# Patient Record
Sex: FEMALE | Race: WHITE | NOT HISPANIC OR LATINO
[De-identification: names, ages, dates, MRNs, and addresses within clinical notes are randomized per-mention and may not be internally consistent; named-entity substitution may affect disease eponyms.]

---

## 2017-01-04 ENCOUNTER — RX RENEWAL (OUTPATIENT)
Age: 82
End: 2017-01-04

## 2017-02-03 ENCOUNTER — APPOINTMENT (OUTPATIENT)
Dept: INTERNAL MEDICINE | Facility: CLINIC | Age: 82
End: 2017-02-03

## 2017-02-03 ENCOUNTER — NON-APPOINTMENT (OUTPATIENT)
Age: 82
End: 2017-02-03

## 2017-02-03 VITALS
DIASTOLIC BLOOD PRESSURE: 90 MMHG | SYSTOLIC BLOOD PRESSURE: 150 MMHG | TEMPERATURE: 98.2 F | HEIGHT: 62 IN | BODY MASS INDEX: 25.03 KG/M2 | WEIGHT: 136 LBS | HEART RATE: 85 BPM

## 2017-02-03 DIAGNOSIS — B36.9 SUPERFICIAL MYCOSIS, UNSPECIFIED: ICD-10-CM

## 2017-02-03 DIAGNOSIS — B35.1 TINEA UNGUIUM: ICD-10-CM

## 2017-02-03 RX ORDER — KETOCONAZOLE 20 MG/G
2 CREAM TOPICAL TWICE DAILY
Qty: 2 | Refills: 1 | Status: ACTIVE | COMMUNITY
Start: 2017-02-03 | End: 1900-01-01

## 2017-04-26 ENCOUNTER — APPOINTMENT (OUTPATIENT)
Dept: ENDOCRINOLOGY | Facility: CLINIC | Age: 82
End: 2017-04-26

## 2017-04-26 VITALS
OXYGEN SATURATION: 98 % | SYSTOLIC BLOOD PRESSURE: 110 MMHG | WEIGHT: 135 LBS | DIASTOLIC BLOOD PRESSURE: 82 MMHG | HEART RATE: 79 BPM | HEIGHT: 62 IN | BODY MASS INDEX: 24.84 KG/M2

## 2017-04-26 RX ORDER — DENOSUMAB 60 MG/ML
60 INJECTION SUBCUTANEOUS
Qty: 1 | Refills: 0 | Status: COMPLETED | OUTPATIENT
Start: 2017-04-26

## 2017-04-26 RX ADMIN — DENOSUMAB 0 MG/ML: 60 INJECTION SUBCUTANEOUS at 00:00

## 2017-05-01 ENCOUNTER — LABORATORY RESULT (OUTPATIENT)
Age: 82
End: 2017-05-01

## 2017-05-01 ENCOUNTER — APPOINTMENT (OUTPATIENT)
Dept: INTERNAL MEDICINE | Facility: CLINIC | Age: 82
End: 2017-05-01

## 2017-05-01 VITALS
WEIGHT: 134 LBS | HEIGHT: 62 IN | DIASTOLIC BLOOD PRESSURE: 77 MMHG | SYSTOLIC BLOOD PRESSURE: 130 MMHG | HEART RATE: 81 BPM | BODY MASS INDEX: 24.66 KG/M2 | TEMPERATURE: 97.9 F

## 2017-05-01 DIAGNOSIS — R82.99 OTHER ABNORMAL FINDINGS IN URINE: ICD-10-CM

## 2017-05-01 DIAGNOSIS — Z00.00 ENCOUNTER FOR GENERAL ADULT MEDICAL EXAMINATION W/OUT ABNORMAL FINDINGS: ICD-10-CM

## 2017-05-03 ENCOUNTER — LABORATORY RESULT (OUTPATIENT)
Age: 82
End: 2017-05-03

## 2017-05-03 LAB
APPEARANCE: CLEAR
BASOPHILS # BLD AUTO: 0.08 K/UL
BASOPHILS NFR BLD AUTO: 0.9 %
BILIRUBIN URINE: NEGATIVE
BLOOD URINE: NEGATIVE
COLOR: YELLOW
CORE LAB FLUID CYTOLOGY: NORMAL
EOSINOPHIL # BLD AUTO: 0.08 K/UL
EOSINOPHIL NFR BLD AUTO: 0.9 %
GLUCOSE QUALITATIVE U: NORMAL MG/DL
HCT VFR BLD CALC: 37.6 %
HGB BLD-MCNC: 11.3 G/DL
KETONES URINE: NEGATIVE
LEUKOCYTE ESTERASE URINE: NEGATIVE
LYMPHOCYTES # BLD AUTO: 1.9 K/UL
LYMPHOCYTES NFR BLD AUTO: 22.4 %
MAN DIFF?: NORMAL
MCHC RBC-ENTMCNC: 21.3 PG
MCHC RBC-ENTMCNC: 30.1 GM/DL
MCV RBC AUTO: 70.9 FL
MONOCYTES # BLD AUTO: 0.44 K/UL
MONOCYTES NFR BLD AUTO: 5.2 %
NEUTROPHILS # BLD AUTO: 5.63 K/UL
NEUTROPHILS NFR BLD AUTO: 66.3 %
NITRITE URINE: NEGATIVE
PH URINE: 7.5
PLATELET # BLD AUTO: 347 K/UL
PROTEIN URINE: NEGATIVE MG/DL
RBC # BLD: 5.3 M/UL
RBC # FLD: 16.9 %
SPECIFIC GRAVITY URINE: 1.02
UROBILINOGEN URINE: 1 MG/DL
WBC # FLD AUTO: 8.49 K/UL

## 2017-05-08 ENCOUNTER — LABORATORY RESULT (OUTPATIENT)
Age: 82
End: 2017-05-08

## 2017-05-09 ENCOUNTER — LABORATORY RESULT (OUTPATIENT)
Age: 82
End: 2017-05-09

## 2017-05-12 ENCOUNTER — MEDICATION RENEWAL (OUTPATIENT)
Age: 82
End: 2017-05-12

## 2017-05-12 LAB
APPEARANCE: CLEAR
BASOPHILS # BLD AUTO: 0 K/UL
BASOPHILS NFR BLD AUTO: 0 %
BILIRUBIN URINE: NEGATIVE
BLOOD URINE: NEGATIVE
COLOR: YELLOW
EOSINOPHIL # BLD AUTO: 0.07 K/UL
EOSINOPHIL NFR BLD AUTO: 0.9 %
GLUCOSE QUALITATIVE U: NORMAL MG/DL
HCT VFR BLD CALC: 35.9 %
HGB BLD-MCNC: 11.3 G/DL
KETONES URINE: NEGATIVE
LEUKOCYTE ESTERASE URINE: ABNORMAL
LYMPHOCYTES # BLD AUTO: 2.14 K/UL
LYMPHOCYTES NFR BLD AUTO: 27 %
MAN DIFF?: NORMAL
MCHC RBC-ENTMCNC: 22.2 PG
MCHC RBC-ENTMCNC: 31.5 GM/DL
MCV RBC AUTO: 70.5 FL
MONOCYTES # BLD AUTO: 0.13 K/UL
MONOCYTES NFR BLD AUTO: 1.7 %
NEUTROPHILS # BLD AUTO: 5.03 K/UL
NEUTROPHILS NFR BLD AUTO: 63.4 %
NITRITE URINE: POSITIVE
PH URINE: 6.5
PLATELET # BLD AUTO: 334 K/UL
PROTEIN URINE: NEGATIVE MG/DL
RBC # BLD: 5.09 M/UL
RBC # FLD: 16.8 %
SPECIFIC GRAVITY URINE: 1.01
UROBILINOGEN URINE: NORMAL MG/DL
WBC # FLD AUTO: 7.94 K/UL

## 2017-05-15 ENCOUNTER — APPOINTMENT (OUTPATIENT)
Dept: CARDIOLOGY | Facility: CLINIC | Age: 82
End: 2017-05-15

## 2017-05-15 ENCOUNTER — NON-APPOINTMENT (OUTPATIENT)
Age: 82
End: 2017-05-15

## 2017-05-15 VITALS
OXYGEN SATURATION: 100 % | HEART RATE: 71 BPM | SYSTOLIC BLOOD PRESSURE: 148 MMHG | HEIGHT: 62 IN | WEIGHT: 135 LBS | BODY MASS INDEX: 24.84 KG/M2 | DIASTOLIC BLOOD PRESSURE: 89 MMHG

## 2017-10-06 ENCOUNTER — OUTPATIENT (OUTPATIENT)
Dept: OUTPATIENT SERVICES | Facility: HOSPITAL | Age: 82
LOS: 1 days | End: 2017-10-06
Payer: MEDICARE

## 2017-10-06 ENCOUNTER — APPOINTMENT (OUTPATIENT)
Dept: CT IMAGING | Facility: IMAGING CENTER | Age: 82
End: 2017-10-06
Payer: MEDICARE

## 2017-10-06 DIAGNOSIS — Z00.8 ENCOUNTER FOR OTHER GENERAL EXAMINATION: ICD-10-CM

## 2017-10-06 PROCEDURE — 74177 CT ABD & PELVIS W/CONTRAST: CPT

## 2017-10-06 PROCEDURE — 74177 CT ABD & PELVIS W/CONTRAST: CPT | Mod: 26

## 2017-10-06 PROCEDURE — 82565 ASSAY OF CREATININE: CPT

## 2017-10-10 ENCOUNTER — MEDICATION RENEWAL (OUTPATIENT)
Age: 82
End: 2017-10-10

## 2017-10-30 ENCOUNTER — APPOINTMENT (OUTPATIENT)
Dept: INTERNAL MEDICINE | Facility: CLINIC | Age: 82
End: 2017-10-30
Payer: MEDICARE

## 2017-10-30 VITALS
OXYGEN SATURATION: 98 % | HEIGHT: 62 IN | BODY MASS INDEX: 25.03 KG/M2 | TEMPERATURE: 97.9 F | SYSTOLIC BLOOD PRESSURE: 134 MMHG | DIASTOLIC BLOOD PRESSURE: 80 MMHG | HEART RATE: 92 BPM | WEIGHT: 136 LBS

## 2017-10-30 DIAGNOSIS — R93.8 ABNORMAL FINDINGS ON DIAGNOSTIC IMAGING OF OTHER SPECIFIED BODY STRUCTURES: ICD-10-CM

## 2017-10-30 DIAGNOSIS — Z23 ENCOUNTER FOR IMMUNIZATION: ICD-10-CM

## 2017-10-30 PROCEDURE — 90662 IIV NO PRSV INCREASED AG IM: CPT

## 2017-10-30 PROCEDURE — G0439: CPT | Mod: 25

## 2017-10-30 PROCEDURE — G0008: CPT

## 2017-10-30 RX ORDER — CIPROFLOXACIN HYDROCHLORIDE 250 MG/1
250 TABLET, FILM COATED ORAL
Qty: 10 | Refills: 0 | Status: DISCONTINUED | COMMUNITY
Start: 2017-05-12 | End: 2017-10-30

## 2017-11-01 ENCOUNTER — APPOINTMENT (OUTPATIENT)
Dept: ENDOCRINOLOGY | Facility: CLINIC | Age: 82
End: 2017-11-01
Payer: MEDICARE

## 2017-11-01 VITALS
SYSTOLIC BLOOD PRESSURE: 130 MMHG | HEART RATE: 78 BPM | OXYGEN SATURATION: 98 % | DIASTOLIC BLOOD PRESSURE: 80 MMHG | HEIGHT: 62 IN | WEIGHT: 139 LBS | BODY MASS INDEX: 25.58 KG/M2

## 2017-11-01 DIAGNOSIS — M81.0 AGE-RELATED OSTEOPOROSIS W/OUT CURRENT PATHOLOGICAL FRACTURE: ICD-10-CM

## 2017-11-01 PROCEDURE — 96372 THER/PROPH/DIAG INJ SC/IM: CPT

## 2017-11-01 PROCEDURE — 99214 OFFICE O/P EST MOD 30 MIN: CPT | Mod: 25

## 2017-11-01 RX ORDER — DENOSUMAB 60 MG/ML
60 INJECTION SUBCUTANEOUS
Qty: 0 | Refills: 0 | Status: COMPLETED | OUTPATIENT
Start: 2017-11-01

## 2017-11-01 RX ADMIN — DENOSUMAB 0 MG/ML: 60 INJECTION SUBCUTANEOUS at 00:00

## 2017-11-11 ENCOUNTER — INPATIENT (INPATIENT)
Facility: HOSPITAL | Age: 82
LOS: 2 days | Discharge: OTHER INSTITUTION | End: 2017-11-14
Attending: HOSPITALIST | Admitting: HOSPITALIST
Payer: MEDICARE

## 2017-11-11 VITALS
RESPIRATION RATE: 17 BRPM | SYSTOLIC BLOOD PRESSURE: 134 MMHG | TEMPERATURE: 98 F | OXYGEN SATURATION: 99 % | DIASTOLIC BLOOD PRESSURE: 83 MMHG | HEART RATE: 78 BPM

## 2017-11-11 LAB
ALBUMIN SERPL ELPH-MCNC: 3.8 G/DL — SIGNIFICANT CHANGE UP (ref 3.3–5)
ALP SERPL-CCNC: 58 U/L — SIGNIFICANT CHANGE UP (ref 40–120)
ALT FLD-CCNC: 6 U/L — SIGNIFICANT CHANGE UP (ref 4–33)
AST SERPL-CCNC: 10 U/L — SIGNIFICANT CHANGE UP (ref 4–32)
BASOPHILS # BLD AUTO: 0.03 K/UL — SIGNIFICANT CHANGE UP (ref 0–0.2)
BASOPHILS NFR BLD AUTO: 0.3 % — SIGNIFICANT CHANGE UP (ref 0–2)
BILIRUB SERPL-MCNC: 0.3 MG/DL — SIGNIFICANT CHANGE UP (ref 0.2–1.2)
BUN SERPL-MCNC: 19 MG/DL — SIGNIFICANT CHANGE UP (ref 7–23)
CALCIUM SERPL-MCNC: 8.5 MG/DL — SIGNIFICANT CHANGE UP (ref 8.4–10.5)
CHLORIDE SERPL-SCNC: 98 MMOL/L — SIGNIFICANT CHANGE UP (ref 98–107)
CO2 SERPL-SCNC: 24 MMOL/L — SIGNIFICANT CHANGE UP (ref 22–31)
CREAT SERPL-MCNC: 0.75 MG/DL — SIGNIFICANT CHANGE UP (ref 0.5–1.3)
EOSINOPHIL # BLD AUTO: 0.04 K/UL — SIGNIFICANT CHANGE UP (ref 0–0.5)
EOSINOPHIL NFR BLD AUTO: 0.4 % — SIGNIFICANT CHANGE UP (ref 0–6)
GLUCOSE SERPL-MCNC: 150 MG/DL — HIGH (ref 70–99)
HCT VFR BLD CALC: 38 % — SIGNIFICANT CHANGE UP (ref 34.5–45)
HGB BLD-MCNC: 11.7 G/DL — SIGNIFICANT CHANGE UP (ref 11.5–15.5)
IMM GRANULOCYTES # BLD AUTO: 0.08 # — SIGNIFICANT CHANGE UP
IMM GRANULOCYTES NFR BLD AUTO: 0.8 % — SIGNIFICANT CHANGE UP (ref 0–1.5)
LYMPHOCYTES # BLD AUTO: 1.4 K/UL — SIGNIFICANT CHANGE UP (ref 1–3.3)
LYMPHOCYTES # BLD AUTO: 13.2 % — SIGNIFICANT CHANGE UP (ref 13–44)
MCHC RBC-ENTMCNC: 21.6 PG — LOW (ref 27–34)
MCHC RBC-ENTMCNC: 30.8 % — LOW (ref 32–36)
MCV RBC AUTO: 70.1 FL — LOW (ref 80–100)
MONOCYTES # BLD AUTO: 0.65 K/UL — SIGNIFICANT CHANGE UP (ref 0–0.9)
MONOCYTES NFR BLD AUTO: 6.1 % — SIGNIFICANT CHANGE UP (ref 2–14)
NEUTROPHILS # BLD AUTO: 8.39 K/UL — HIGH (ref 1.8–7.4)
NEUTROPHILS NFR BLD AUTO: 79.2 % — HIGH (ref 43–77)
NRBC # FLD: 0 — SIGNIFICANT CHANGE UP
PLATELET # BLD AUTO: 329 K/UL — SIGNIFICANT CHANGE UP (ref 150–400)
PMV BLD: 9.2 FL — SIGNIFICANT CHANGE UP (ref 7–13)
POTASSIUM SERPL-MCNC: 3.8 MMOL/L — SIGNIFICANT CHANGE UP (ref 3.5–5.3)
POTASSIUM SERPL-SCNC: 3.8 MMOL/L — SIGNIFICANT CHANGE UP (ref 3.5–5.3)
PROT SERPL-MCNC: 6.8 G/DL — SIGNIFICANT CHANGE UP (ref 6–8.3)
RBC # BLD: 5.42 M/UL — HIGH (ref 3.8–5.2)
RBC # FLD: 16.8 % — HIGH (ref 10.3–14.5)
SODIUM SERPL-SCNC: 137 MMOL/L — SIGNIFICANT CHANGE UP (ref 135–145)
WBC # BLD: 10.59 K/UL — HIGH (ref 3.8–10.5)
WBC # FLD AUTO: 10.59 K/UL — HIGH (ref 3.8–10.5)

## 2017-11-11 RX ORDER — SODIUM CHLORIDE 9 MG/ML
1000 INJECTION INTRAMUSCULAR; INTRAVENOUS; SUBCUTANEOUS ONCE
Qty: 0 | Refills: 0 | Status: COMPLETED | OUTPATIENT
Start: 2017-11-11 | End: 2017-11-11

## 2017-11-11 RX ORDER — ONDANSETRON 8 MG/1
4 TABLET, FILM COATED ORAL ONCE
Qty: 0 | Refills: 0 | Status: COMPLETED | OUTPATIENT
Start: 2017-11-11 | End: 2017-11-11

## 2017-11-11 RX ADMIN — SODIUM CHLORIDE 1000 MILLILITER(S): 9 INJECTION INTRAMUSCULAR; INTRAVENOUS; SUBCUTANEOUS at 23:20

## 2017-11-11 RX ADMIN — ONDANSETRON 4 MILLIGRAM(S): 8 TABLET, FILM COATED ORAL at 23:20

## 2017-11-11 NOTE — ED PROVIDER NOTE - OBJECTIVE STATEMENT
86yo f pmh afib(on eloquis), parkinsons, hypothyroid, htn, chronic constipation pw abdominal pain, nausea, and vomiting. NBNB emesis since this afternoon.  Mild crampy belly pain with softer than usual well formed stools.  Pt was recently started on Amitiza and had been titrating up the dose, pt then stopped the medication because her prescription ran out, pt restarted meds today and then developed symptoms. No known fevers/chills/urinary symptoms.

## 2017-11-11 NOTE — ED ADULT NURSE NOTE - ED STAT RN HANDOFF DETAILS
Report given to CDU, pt was able to tolerate PO Challenge, NAD observed at this time, pt appears comfortable with son at bedside, Pt still complaining of mild nausea and weakness, pt able to stand and ambulate with assistance "shuffled" states she felt weak.

## 2017-11-11 NOTE — ED PROVIDER NOTE - PROGRESS NOTE DETAILS
Manuel SHAW: I received sign out from Dr Martinez. UA negative, pt reports feeling better after IVFs.  She is tolerating POs, no nausea or recurrent vomiting.  Pt cautioned about restarting amitiza until GI follow-up.  Pt is stable for discharge home. Manuel SHAW: Pt was feeling better, discharged but then started to feel weak and shaky.  States that she vomited after her dose of sinemet last night and has not taken her morning dose yet.  Will hold discharge, give home dose of sinemet and reassess.  If pt cont to be unable to walk, will need admission. Pt is feeling better after sinemet but still generally weak.  Will obs in cdu for repeat labs, hydration (pt requesting PO hydration for now), reassessment.

## 2017-11-11 NOTE — ED ADULT TRIAGE NOTE - CHIEF COMPLAINT QUOTE
Pt arrives to ED via EMS c/o Nausea and dizziness since Thursday.  Pt recently started a new medication for "bloating."  Pt started vomiting today.  Pt reports she typically has dizziness due to her vision.  New medication is called Amitiza 8 microgram capsule but since 11/3/17 she has increased the dosage.  Pt has also had increased BM frequency.  Pt denies pain.

## 2017-11-11 NOTE — ED PROVIDER NOTE - MEDICAL DECISION MAKING DETAILS
pt with likely medication side effect n/v after starting new med, will give fluids/zofran, check basics.

## 2017-11-11 NOTE — ED PROVIDER NOTE - ATTENDING CONTRIBUTION TO CARE
87F h/o HTN, afib on eloquis, hypothyroid presents for n/v/d.  H/o constipation was recently started on a new medication by GI, which she has been steadily increasing.  Reports that she has been having more regular stools, tonight with softer stool.  Also with nausea and vomiting.  Feels generally weak, no lightheadedness. Unchanged chronic abdominal bloating, no pain. On exam well appearing, mmm, lungs clear, rrr, abd soft, 2+ pulses, no edema, no rash, speech clear, no focal neuro deficits.  Plan for labs, IVF, zofran, EKG.  No focal abd ttp therefore no imaging at this time.

## 2017-11-11 NOTE — ED ADULT NURSE NOTE - OBJECTIVE STATEMENT
pt received to room #3 with c/o dizziness progressively becoming worse with nausea and vomiting today. pt states dizziness get worse when changing from a sitting/lying position to standing. pt appears flush and diaphoretic. son at bedside. denies cp and sob. IV placed, labs drawn and sent. pending MD avalos, will cont to monitor.

## 2017-11-12 DIAGNOSIS — Z29.9 ENCOUNTER FOR PROPHYLACTIC MEASURES, UNSPECIFIED: ICD-10-CM

## 2017-11-12 DIAGNOSIS — R71.8 OTHER ABNORMALITY OF RED BLOOD CELLS: ICD-10-CM

## 2017-11-12 DIAGNOSIS — R11.2 NAUSEA WITH VOMITING, UNSPECIFIED: ICD-10-CM

## 2017-11-12 DIAGNOSIS — E03.9 HYPOTHYROIDISM, UNSPECIFIED: ICD-10-CM

## 2017-11-12 DIAGNOSIS — I48.91 UNSPECIFIED ATRIAL FIBRILLATION: ICD-10-CM

## 2017-11-12 DIAGNOSIS — I10 ESSENTIAL (PRIMARY) HYPERTENSION: ICD-10-CM

## 2017-11-12 DIAGNOSIS — G20 PARKINSON'S DISEASE: ICD-10-CM

## 2017-11-12 DIAGNOSIS — R53.1 WEAKNESS: ICD-10-CM

## 2017-11-12 DIAGNOSIS — K59.09 OTHER CONSTIPATION: ICD-10-CM

## 2017-11-12 LAB
ALBUMIN SERPL ELPH-MCNC: 3.8 G/DL — SIGNIFICANT CHANGE UP (ref 3.3–5)
ALP SERPL-CCNC: 59 U/L — SIGNIFICANT CHANGE UP (ref 40–120)
ALT FLD-CCNC: 5 U/L — SIGNIFICANT CHANGE UP (ref 4–33)
APPEARANCE UR: CLEAR — SIGNIFICANT CHANGE UP
APPEARANCE UR: CLEAR — SIGNIFICANT CHANGE UP
AST SERPL-CCNC: 12 U/L — SIGNIFICANT CHANGE UP (ref 4–32)
BASOPHILS # BLD AUTO: 0.03 K/UL — SIGNIFICANT CHANGE UP (ref 0–0.2)
BASOPHILS NFR BLD AUTO: 0.3 % — SIGNIFICANT CHANGE UP (ref 0–2)
BILIRUB SERPL-MCNC: 0.4 MG/DL — SIGNIFICANT CHANGE UP (ref 0.2–1.2)
BILIRUB UR-MCNC: NEGATIVE — SIGNIFICANT CHANGE UP
BILIRUB UR-MCNC: NEGATIVE — SIGNIFICANT CHANGE UP
BLOOD UR QL VISUAL: NEGATIVE — SIGNIFICANT CHANGE UP
BLOOD UR QL VISUAL: NEGATIVE — SIGNIFICANT CHANGE UP
BUN SERPL-MCNC: 13 MG/DL — SIGNIFICANT CHANGE UP (ref 7–23)
CALCIUM SERPL-MCNC: 8.2 MG/DL — LOW (ref 8.4–10.5)
CHLORIDE SERPL-SCNC: 102 MMOL/L — SIGNIFICANT CHANGE UP (ref 98–107)
CO2 SERPL-SCNC: 24 MMOL/L — SIGNIFICANT CHANGE UP (ref 22–31)
COLOR SPEC: SIGNIFICANT CHANGE UP
COLOR SPEC: SIGNIFICANT CHANGE UP
CREAT SERPL-MCNC: 0.58 MG/DL — SIGNIFICANT CHANGE UP (ref 0.5–1.3)
EOSINOPHIL # BLD AUTO: 0.03 K/UL — SIGNIFICANT CHANGE UP (ref 0–0.5)
EOSINOPHIL NFR BLD AUTO: 0.3 % — SIGNIFICANT CHANGE UP (ref 0–6)
GLUCOSE SERPL-MCNC: 114 MG/DL — HIGH (ref 70–99)
GLUCOSE UR-MCNC: NEGATIVE — SIGNIFICANT CHANGE UP
GLUCOSE UR-MCNC: NEGATIVE — SIGNIFICANT CHANGE UP
HBA1C BLD-MCNC: 5.9 % — HIGH (ref 4–5.6)
HCT VFR BLD CALC: 38.6 % — SIGNIFICANT CHANGE UP (ref 34.5–45)
HGB BLD-MCNC: 11.5 G/DL — SIGNIFICANT CHANGE UP (ref 11.5–15.5)
IMM GRANULOCYTES # BLD AUTO: 0.04 # — SIGNIFICANT CHANGE UP
IMM GRANULOCYTES NFR BLD AUTO: 0.4 % — SIGNIFICANT CHANGE UP (ref 0–1.5)
KETONES UR-MCNC: NEGATIVE — SIGNIFICANT CHANGE UP
KETONES UR-MCNC: NEGATIVE — SIGNIFICANT CHANGE UP
LEUKOCYTE ESTERASE UR-ACNC: HIGH
LEUKOCYTE ESTERASE UR-ACNC: NEGATIVE — SIGNIFICANT CHANGE UP
LIDOCAIN IGE QN: 25.8 U/L — SIGNIFICANT CHANGE UP (ref 7–60)
LYMPHOCYTES # BLD AUTO: 1.76 K/UL — SIGNIFICANT CHANGE UP (ref 1–3.3)
LYMPHOCYTES # BLD AUTO: 15.9 % — SIGNIFICANT CHANGE UP (ref 13–44)
MCHC RBC-ENTMCNC: 21 PG — LOW (ref 27–34)
MCHC RBC-ENTMCNC: 29.8 % — LOW (ref 32–36)
MCV RBC AUTO: 70.4 FL — LOW (ref 80–100)
MONOCYTES # BLD AUTO: 0.8 K/UL — SIGNIFICANT CHANGE UP (ref 0–0.9)
MONOCYTES NFR BLD AUTO: 7.2 % — SIGNIFICANT CHANGE UP (ref 2–14)
MUCOUS THREADS # UR AUTO: SIGNIFICANT CHANGE UP
NEUTROPHILS # BLD AUTO: 8.43 K/UL — HIGH (ref 1.8–7.4)
NEUTROPHILS NFR BLD AUTO: 75.9 % — SIGNIFICANT CHANGE UP (ref 43–77)
NITRITE UR-MCNC: NEGATIVE — SIGNIFICANT CHANGE UP
NITRITE UR-MCNC: NEGATIVE — SIGNIFICANT CHANGE UP
NON-SQ EPI CELLS # UR AUTO: <1 — SIGNIFICANT CHANGE UP
NRBC # FLD: 0 — SIGNIFICANT CHANGE UP
PH UR: 7 — SIGNIFICANT CHANGE UP (ref 4.6–8)
PH UR: 7 — SIGNIFICANT CHANGE UP (ref 4.6–8)
PLATELET # BLD AUTO: 341 K/UL — SIGNIFICANT CHANGE UP (ref 150–400)
PMV BLD: 9.8 FL — SIGNIFICANT CHANGE UP (ref 7–13)
POTASSIUM SERPL-MCNC: 4.6 MMOL/L — SIGNIFICANT CHANGE UP (ref 3.5–5.3)
POTASSIUM SERPL-SCNC: 4.6 MMOL/L — SIGNIFICANT CHANGE UP (ref 3.5–5.3)
PROT SERPL-MCNC: 6.5 G/DL — SIGNIFICANT CHANGE UP (ref 6–8.3)
PROT UR-MCNC: 10 — SIGNIFICANT CHANGE UP
PROT UR-MCNC: NEGATIVE — SIGNIFICANT CHANGE UP
RBC # BLD: 5.48 M/UL — HIGH (ref 3.8–5.2)
RBC # FLD: 17.2 % — HIGH (ref 10.3–14.5)
RBC CASTS # UR COMP ASSIST: SIGNIFICANT CHANGE UP (ref 0–?)
SODIUM SERPL-SCNC: 138 MMOL/L — SIGNIFICANT CHANGE UP (ref 135–145)
SP GR SPEC: 1.01 — SIGNIFICANT CHANGE UP (ref 1–1.03)
SP GR SPEC: 1.01 — SIGNIFICANT CHANGE UP (ref 1–1.03)
SQUAMOUS # UR AUTO: SIGNIFICANT CHANGE UP
SQUAMOUS # UR AUTO: SIGNIFICANT CHANGE UP
TRANS CELLS #/AREA URNS HPF: 3 — SIGNIFICANT CHANGE UP
TSH SERPL-MCNC: 0.47 UIU/ML — SIGNIFICANT CHANGE UP (ref 0.27–4.2)
UROBILINOGEN FLD QL: NORMAL E.U. — SIGNIFICANT CHANGE UP (ref 0.1–0.2)
UROBILINOGEN FLD QL: NORMAL E.U. — SIGNIFICANT CHANGE UP (ref 0.1–0.2)
WBC # BLD: 11.09 K/UL — HIGH (ref 3.8–10.5)
WBC # FLD AUTO: 11.09 K/UL — HIGH (ref 3.8–10.5)
WBC UR QL: SIGNIFICANT CHANGE UP (ref 0–?)
WBC UR QL: SIGNIFICANT CHANGE UP (ref 0–?)

## 2017-11-12 PROCEDURE — 99223 1ST HOSP IP/OBS HIGH 75: CPT

## 2017-11-12 RX ORDER — LOSARTAN POTASSIUM 100 MG/1
100 TABLET, FILM COATED ORAL DAILY
Qty: 0 | Refills: 0 | Status: DISCONTINUED | OUTPATIENT
Start: 2017-11-12 | End: 2017-11-14

## 2017-11-12 RX ORDER — CARBIDOPA AND LEVODOPA 25; 100 MG/1; MG/1
1 TABLET ORAL
Qty: 0 | Refills: 0 | Status: DISCONTINUED | OUTPATIENT
Start: 2017-11-12 | End: 2017-11-14

## 2017-11-12 RX ORDER — SIMETHICONE 80 MG/1
80 TABLET, CHEWABLE ORAL THREE TIMES A DAY
Qty: 0 | Refills: 0 | Status: DISCONTINUED | OUTPATIENT
Start: 2017-11-12 | End: 2017-11-14

## 2017-11-12 RX ORDER — ONDANSETRON 8 MG/1
4 TABLET, FILM COATED ORAL EVERY 8 HOURS
Qty: 0 | Refills: 0 | Status: DISCONTINUED | OUTPATIENT
Start: 2017-11-12 | End: 2017-11-14

## 2017-11-12 RX ORDER — APIXABAN 2.5 MG/1
1 TABLET, FILM COATED ORAL
Qty: 0 | Refills: 0 | COMMUNITY

## 2017-11-12 RX ORDER — LEVOTHYROXINE SODIUM 125 MCG
100 TABLET ORAL DAILY
Qty: 0 | Refills: 0 | Status: DISCONTINUED | OUTPATIENT
Start: 2017-11-12 | End: 2017-11-14

## 2017-11-12 RX ORDER — SENNA PLUS 8.6 MG/1
2 TABLET ORAL AT BEDTIME
Qty: 0 | Refills: 0 | Status: DISCONTINUED | OUTPATIENT
Start: 2017-11-12 | End: 2017-11-14

## 2017-11-12 RX ORDER — AMLODIPINE BESYLATE 2.5 MG/1
1 TABLET ORAL
Qty: 0 | Refills: 0 | COMMUNITY

## 2017-11-12 RX ORDER — CARBIDOPA AND LEVODOPA 25; 100 MG/1; MG/1
1 TABLET ORAL ONCE
Qty: 0 | Refills: 0 | Status: COMPLETED | OUTPATIENT
Start: 2017-11-12 | End: 2017-11-12

## 2017-11-12 RX ORDER — DOCUSATE SODIUM 100 MG
100 CAPSULE ORAL THREE TIMES A DAY
Qty: 0 | Refills: 0 | Status: DISCONTINUED | OUTPATIENT
Start: 2017-11-12 | End: 2017-11-14

## 2017-11-12 RX ORDER — ONDANSETRON 8 MG/1
1 TABLET, FILM COATED ORAL
Qty: 2 | Refills: 0 | OUTPATIENT
Start: 2017-11-12

## 2017-11-12 RX ORDER — CHOLECALCIFEROL (VITAMIN D3) 125 MCG
1 CAPSULE ORAL
Qty: 0 | Refills: 0 | COMMUNITY

## 2017-11-12 RX ORDER — CHOLECALCIFEROL (VITAMIN D3) 125 MCG
2000 CAPSULE ORAL DAILY
Qty: 0 | Refills: 0 | Status: DISCONTINUED | OUTPATIENT
Start: 2017-11-12 | End: 2017-11-14

## 2017-11-12 RX ORDER — LEVOTHYROXINE SODIUM 125 MCG
1 TABLET ORAL
Qty: 0 | Refills: 0 | COMMUNITY

## 2017-11-12 RX ORDER — CALCIUM CARBONATE 500(1250)
0 TABLET ORAL
Qty: 0 | Refills: 0 | COMMUNITY

## 2017-11-12 RX ORDER — MULTIVIT-MIN/FERROUS GLUCONATE 9 MG/15 ML
1 LIQUID (ML) ORAL
Qty: 0 | Refills: 0 | COMMUNITY

## 2017-11-12 RX ORDER — SODIUM CHLORIDE 9 MG/ML
1000 INJECTION INTRAMUSCULAR; INTRAVENOUS; SUBCUTANEOUS
Qty: 0 | Refills: 0 | Status: DISCONTINUED | OUTPATIENT
Start: 2017-11-12 | End: 2017-11-14

## 2017-11-12 RX ORDER — SODIUM CHLORIDE 9 MG/ML
1000 INJECTION INTRAMUSCULAR; INTRAVENOUS; SUBCUTANEOUS
Qty: 0 | Refills: 0 | Status: DISCONTINUED | OUTPATIENT
Start: 2017-11-12 | End: 2017-11-12

## 2017-11-12 RX ORDER — APIXABAN 2.5 MG/1
2.5 TABLET, FILM COATED ORAL EVERY 12 HOURS
Qty: 0 | Refills: 0 | Status: DISCONTINUED | OUTPATIENT
Start: 2017-11-12 | End: 2017-11-14

## 2017-11-12 RX ORDER — AMLODIPINE BESYLATE 2.5 MG/1
5 TABLET ORAL DAILY
Qty: 0 | Refills: 0 | Status: DISCONTINUED | OUTPATIENT
Start: 2017-11-12 | End: 2017-11-14

## 2017-11-12 RX ADMIN — CARBIDOPA AND LEVODOPA 1 TABLET(S): 25; 100 TABLET ORAL at 09:38

## 2017-11-12 RX ADMIN — CARBIDOPA AND LEVODOPA 1 TABLET(S): 25; 100 TABLET ORAL at 17:23

## 2017-11-12 RX ADMIN — Medication 100 MILLIGRAM(S): at 22:02

## 2017-11-12 RX ADMIN — SODIUM CHLORIDE 200 MILLILITER(S): 9 INJECTION INTRAMUSCULAR; INTRAVENOUS; SUBCUTANEOUS at 07:14

## 2017-11-12 RX ADMIN — CARBIDOPA AND LEVODOPA 1 TABLET(S): 25; 100 TABLET ORAL at 05:15

## 2017-11-12 RX ADMIN — SODIUM CHLORIDE 75 MILLILITER(S): 9 INJECTION INTRAMUSCULAR; INTRAVENOUS; SUBCUTANEOUS at 19:08

## 2017-11-12 RX ADMIN — Medication 100 MICROGRAM(S): at 07:12

## 2017-11-12 RX ADMIN — APIXABAN 2.5 MILLIGRAM(S): 2.5 TABLET, FILM COATED ORAL at 17:24

## 2017-11-12 RX ADMIN — Medication 1 DROP(S): at 23:30

## 2017-11-12 RX ADMIN — AMLODIPINE BESYLATE 5 MILLIGRAM(S): 2.5 TABLET ORAL at 07:12

## 2017-11-12 RX ADMIN — SODIUM CHLORIDE 120 MILLILITER(S): 9 INJECTION INTRAMUSCULAR; INTRAVENOUS; SUBCUTANEOUS at 12:45

## 2017-11-12 RX ADMIN — LOSARTAN POTASSIUM 100 MILLIGRAM(S): 100 TABLET, FILM COATED ORAL at 07:12

## 2017-11-12 NOTE — H&P ADULT - NSHPLABSRESULTS_GEN_ALL_CORE
138  |  102  |  13  ----------------------------<  114<H>  4.6   |  24  |  0.58    Ca    8.2<L>      2017 07:35    TPro  6.5  /  Alb  3.8  /  TBili  0.4  /  DBili  x   /  AST  12  /  ALT  5   /  AlkPhos  59                          11.5   11.09 )-----------( 341      ( 2017 07:35 )             38.6     LIVER FUNCTIONS - ( 2017 07:35 )  Alb: 3.8 g/dL / Pro: 6.5 g/dL / ALK PHOS: 59 u/L / ALT: 5 u/L / AST: 12 u/L / GGT: x           Urinalysis Basic - ( 2017 09:22 )  Color: COLORL / Appearance: CLEAR / S.008 / pH: 7.0  Gluc: NEGATIVE / Ketone: NEGATIVE  / Bili: NEGATIVE / Urobili: NORMAL E.U.   Blood: NEGATIVE / Protein: NEGATIVE / Nitrite: NEGATIVE   Leuk Esterase: NEGATIVE / RBC: x / WBC 0-2   Sq Epi: OCC / Non Sq Epi: x / Bacteria: x     138  |  102  |  13  ----------------------------<  114<H>  4.6   |  24  |  0.58    Ca    8.2<L>      2017 07:35    TPro  6.5  /  Alb  3.8  /  TBili  0.4  /  DBili  x   /  AST  12  /  ALT  5   /  AlkPhos  59                          11.5   11.09 )-----------( 341      ( 2017 07:35 )             38.6     LIVER FUNCTIONS - ( 2017 07:35 )  Alb: 3.8 g/dL / Pro: 6.5 g/dL / ALK PHOS: 59 u/L / ALT: 5 u/L / AST: 12 u/L / GGT: x           Urinalysis Basic - ( 2017 09:22 )  Color: COLORL / Appearance: CLEAR / S.008 / pH: 7.0  Gluc: NEGATIVE / Ketone: NEGATIVE  / Bili: NEGATIVE / Urobili: NORMAL E.U.   Blood: NEGATIVE / Protein: NEGATIVE / Nitrite: NEGATIVE   Leuk Esterase: NEGATIVE / RBC: x / WBC 0-2   Sq Epi: OCC / Non Sq Epi: x / Bacteria: x    EKG personally reviewed - 74bpm NSR, LAD, LAFB, Q waves in III, aVF, V1, QTc 470ms (unchanged from prior)

## 2017-11-12 NOTE — PHYSICAL THERAPY INITIAL EVALUATION ADULT - IMPAIRMENTS FOUND, PT EVAL
aerobic capacity/endurance/gait, locomotion, and balance/gross motor/muscle strength/poor safety awareness

## 2017-11-12 NOTE — PHYSICAL THERAPY INITIAL EVALUATION ADULT - PERTINENT HX OF CURRENT PROBLEM, REHAB EVAL
Patient admitted for complaints of nausea/ dizziness that began on Thursday, she reports taking a new medication for bloating and she had an episode of vomiting/ diarrhea.

## 2017-11-12 NOTE — H&P ADULT - ASSESSMENT
87-year-old female with afib (on Eloquis), Parkinson's, hypothyroid, HTN, chronic constipation p/w abdominal bloating, nausea, and vomiting, found to require rehabilitation due to weakness, admitted for placement in rehabilitation facility as patient lives alone and concern for possible falls at home.

## 2017-11-12 NOTE — PROVIDER CONTACT NOTE (OTHER) - ACTION/TREATMENT ORDERED:
New referral sent to [facility], recommended to hire private aide, Pt's son provides transportation at d/c time.

## 2017-11-12 NOTE — ED CDU PROVIDER INITIAL DAY NOTE - PROGRESS NOTE DETAILS
Pt notes resolution of nausea.  No vomiting.  No recurrence of diarrhea.  Pt notes however still having weakness in her legs which pt attributes to missing dose of Senimet last night.  Will send UA/UCx, continue with senimet, hydrate and reassess.  Pt states she lives alone and has no HHA.  Son lives across the street.  Will have case management and physical therapy involvement for safe discharge home. Pt notes resolution of nausea.  No vomiting.  No recurrence of diarrhea.  Pt notes however still having weakness in her legs which pt attributes to missing dose of Senimet last night.  Will send UA/UCx, continue with senimet, hydrate and reassess.  Pt states she lives alone and has no HHA.  Son lives across the street.  Will have case management and physical therapy involvement for safe discharge home.  DR Bloch- agree with above- patient nervous about ambulation feels better, no nausea, Chest clear, abd soft nontender, ext no edema. will assess gait and safe discharge if possible Pt endorses improvement in nausea and abdominal pain.  Pt notes strength not back to baseline.  Pt's son requests admission for possible rehab placement. Pt endorses improvement in nausea and abdominal pain.  Pt notes strength not back to baseline.  Pt's son requests admission for possible rehab placement.  Called pt's PMD (Dr. Dino Pierson) answering service. Received call back from on call physician Dr. Donohue who is in agreement with discharge; requesting admission to Hospitalist.  Pt accepted to Dr. Aparicio.  MAR text paged at this time.

## 2017-11-12 NOTE — PROVIDER CONTACT NOTE (OTHER) - BACKGROUND
Patient admitted for complaints of nausea/ dizziness that began on Thursday, she reports taking a new medication for bloating and she had an episode of vomiting/ diarrhea.
PMD: JOHNATHAN Hoyos. Tel: [phone] Fax: [phone]

## 2017-11-12 NOTE — ED CDU PROVIDER INITIAL DAY NOTE - OBJECTIVE STATEMENT
87 year old female with a PMHx of afib(on eloquis), parkinsons, hypothyroid, htn, chronic constipation pw abdominal discomfort, nausea, decreased PO intake and 1 episode of NBNB emesis yesterday.  Mild crampy belly pain with softer than usual well formed stools.  Pt was recently started on Amitiza and had been titrating up the dose, pt then stopped the medication because her prescription ran out 3 days ago, pt restarted meds today and then developed symptoms. Denies f/c, CP, SOB, dysuria, hematuria, melena, hematochezia.  In the ED: Pt was unable to stand because she felt too weak/shaky - Given home medication of Sinemet and after 1.5 hours still unable to stand secondary to weakness - shakiness improved. Plan: IVF repeat labs.

## 2017-11-12 NOTE — H&P ADULT - NSHPPHYSICALEXAM_GEN_ALL_CORE
PHYSICAL EXAM:  GENERAL: NAD, well-developed, well-nourished  HEAD:  Atraumatic, Normocephalic  EYES: EOMI, PERRLA, conjunctiva and sclera clear  NECK: Supple, No JVD  CHEST/LUNG: Clear to auscultation bilaterally; No wheezes, rales or rhonchi  HEART: Regular rate and rhythm; No murmurs, rubs, or gallops, (+)S1, S2  ABDOMEN: Soft, Nontender, Minimally distended/tympanic to palpation; Normal Bowel sounds   EXTREMITIES:  2+ Peripheral Pulses, No clubbing, cyanosis, or edema  PSYCH: normal mood and affect  NEUROLOGY: AAOx3 (except for day of the month she stated 15th?), pill rolling tremor b/l hands  SKIN: No rashes or lesions

## 2017-11-12 NOTE — H&P ADULT - HISTORY OF PRESENT ILLNESS
87-year-old female with afib (on Eloquis), Parkinson's, hypothyroid, HTN, chronic constipation p/w abdominal discomfort, nausea, decreased PO intake and 1 episode of NBNB emesis yesterday.  Mild crampy belly pain with softer than usual well formed stools.  Pt was recently started on Amitiza and had been titrating up the dose, pt then stopped the medication because her prescription ran out 3 days ago, pt restarted meds today and then developed symptoms.   In the ED VS: 98.5  69-81  128-145/73-88  16  97-99%RA, received 1L NS IVF, ondansetron 4mg IV x1 and home meds while in CDU. 87-year-old female with afib (on Eloquis), Parkinson's, hypothyroid, HTN, chronic constipation p/w abdominal bloating, nausea, and 2 episodes of NBNB vomiting yesterday.  She reports decreased PO intake due to anorexia for the past few days.  Pt was recently started on Amitiza and had been titrating up the dose, however she ran out of medications a few days ago, restarted medications on Friday and then started to develop the above symptoms.  She denies any fevers/chills, chest pain, melena, hematochezia, diarrhea, dysuria, hematuria.  No recent falls at home however per son, at bedside, patient has been noticeably weaker for the past few weeks.  She lives at home alone and ambulates without aid.  Patient initially observed in CDU however PT evaluation recommended rehab facility, therefore patient is admitted for placement in rehabilitation facility.  Nausea has resolved at this point, still reports abdominal bloating.  In the ED VS: 98.5  69-81  128-145/73-88  16  97-99%RA, received 1L NS IVF, ondansetron 4mg IV x1 and home meds while in CDU.

## 2017-11-12 NOTE — H&P ADULT - PROBLEM SELECTOR PLAN 4
c/w synthroid  TSH wnl will restart on Amitiza, can d/c if recurrent nausea or bloating worsens  c/w colace and senna PRN

## 2017-11-12 NOTE — PHYSICAL THERAPY INITIAL EVALUATION ADULT - ADDITIONAL COMMENTS
patient reports using walker in home and does not leave the house/ climb stairs when her son is not present. Son lives across the street

## 2017-11-12 NOTE — PHYSICAL THERAPY INITIAL EVALUATION ADULT - MANUAL MUSCLE TESTING RESULTS, REHAB EVAL
strength grossly assessed to 3+/5 throughout bi-lateral UEs and 3-/5 throughout bi-lateral LEs/grossly assessed due to

## 2017-11-12 NOTE — ED CDU PROVIDER INITIAL DAY NOTE - ATTENDING CONTRIBUTION TO CARE
Manuel SHAW: 86 y/o F with h/o AF (on eliquis), Parkinsons, hypothyroidism, HTN, chronic constipation (recently started on amitiza) here with n/v.  Pt denies associated abd pain, fever, diarrhea.  States she had similar sxs as they were increasing her dose of amitiza (prescribed by GI for chronic constipation).  She has recently ran out and then restarted on the medication with return of nausea and vomiting.  Labs wnl in ER and pt began tolerating PO, but cont to feel generally weak.  Elderly female, lying comfortably in stretcher, awake and alert, nontoxic.  VSS.  Lungs cta bl.  Cards nl S1/S2, RRR, no MRG.  Abd soft ntnd with active bowel sounds.  No focal neuro deficits.  Given perisistent weakness s/p recent nausea and vomiting, will obs in cdu for repeat labs, iv hydration and reassessment.

## 2017-11-12 NOTE — H&P ADULT - NSHPSOCIALHISTORY_GEN_ALL_CORE
Social History:    Marital Status:  (   )    (   ) Single    (   )    ( x )   Lives with: ( x ) alone  (  ) children   (  ) spouse   (  ) parents  (  ) other    Substance Use (street drugs): ( x ) never used  (  ) other:  Tobacco Usage:  ( x  ) never smoked   (   ) former smoker   (   ) current smoker  (     ) pack year  (        ) last cigarette date  Alcohol Usage: No alcohol use

## 2017-11-12 NOTE — ED CDU PROVIDER DISPOSITION NOTE - CLINICAL COURSE
Dr Bloch- Patient came with n/v  loose stool, likely medication related, feels better, taking PO, but ambulating poorly, lives alone, concern for safety, will admit for rehab.

## 2017-11-12 NOTE — H&P ADULT - NSHPREVIEWOFSYSTEMS_GEN_ALL_CORE
REVIEW OF SYSTEMS:    CONSTITUTIONAL: No weakness, fevers or chills  EYES/ENT: (+)dry eyes;  No dysphagia  NECK: No pain or stiffness  RESPIRATORY: No cough; No shortness of breath  CARDIOVASCULAR: No chest pain or palpitations; No LE edema  GASTROINTESTINAL: No abdominal or epigastric pain. (+) nausea, vomiting; No hematemesis; No diarrhea; Improved constipation. No melena or hematochezia.  GENITOURINARY: No dysuria, frequency or hematuria  NEUROLOGICAL: No numbness or weakness  SKIN: No itching, burning, rashes, or lesions   All other review of systems is negative unless indicated above.

## 2017-11-12 NOTE — ED CDU PROVIDER INITIAL DAY NOTE - LIVES WITH, PROFILE
lives in secondary story apartment alone; son lives across the street and assists mother when she needs to go out into the community or negotiate the stairs./alone

## 2017-11-12 NOTE — ED CDU PROVIDER INITIAL DAY NOTE - NONTENDER LOCATION
left lower quadrant/periumbilical/umbilical/suprapubic/left costovertebral angle/left upper quadrant/right costovertebral angle/right upper quadrant/right lower quadrant

## 2017-11-12 NOTE — ED CDU PROVIDER INITIAL DAY NOTE - MEDICAL DECISION MAKING DETAILS
87 year old female with a PMHx of afib(on eloquis), parkinsons, hypothyroid, htn, chronic constipation pw abdominal discomfort, nausea, decreased PO intake and 1 episode of NBNB emesis yesterday.  Plan: ivf, rpt labs

## 2017-11-12 NOTE — H&P ADULT - PROBLEM SELECTOR PLAN 2
FOBT negative from 2016 per [employer] (microcytosis is stable since that time)  per son, will be seeing gyn soon for increased endometrial thickness seen on CT abd/pelvis  TSH wnl  check iron studies as outpatient

## 2017-11-12 NOTE — PROVIDER CONTACT NOTE (OTHER) - ASSESSMENT
Spoke with PA, patient is OK to be seen for PT. ROM WFL, strength grossly assessed to 3+/5 throughout bi-lateral UEs and 3/5 throughout bi-lateral LEs. Patient transferred supine->sit with CG, sit->stand with rolling walker and CG, ambulated 25ft. with CG and rolling walker. Patient returned to chair, left in NAD, RN made aware. Patient reports that she ambulates in home with walker and only goes out when son is present.
Pt was referred to ED CM to set-up Home PT services, Pt seen by PT today, Spoke to pt and Pt's son (Ever at [phone]) regarding discharge plan, Pt resides alone in a co-op apartment, uses RW, home care process explained, list of home care services and private hire aide provided, Pt and son are agreement with discharge plan.

## 2017-11-13 LAB
BACTERIA UR CULT: SIGNIFICANT CHANGE UP
SPECIMEN SOURCE: SIGNIFICANT CHANGE UP

## 2017-11-13 PROCEDURE — 99233 SBSQ HOSP IP/OBS HIGH 50: CPT

## 2017-11-13 RX ORDER — POLYETHYLENE GLYCOL 3350 17 G/17G
17 POWDER, FOR SOLUTION ORAL DAILY
Qty: 0 | Refills: 0 | Status: DISCONTINUED | OUTPATIENT
Start: 2017-11-13 | End: 2017-11-14

## 2017-11-13 RX ADMIN — Medication 100 MILLIGRAM(S): at 13:57

## 2017-11-13 RX ADMIN — CARBIDOPA AND LEVODOPA 1 TABLET(S): 25; 100 TABLET ORAL at 06:32

## 2017-11-13 RX ADMIN — APIXABAN 2.5 MILLIGRAM(S): 2.5 TABLET, FILM COATED ORAL at 18:01

## 2017-11-13 RX ADMIN — CARBIDOPA AND LEVODOPA 1 TABLET(S): 25; 100 TABLET ORAL at 18:01

## 2017-11-13 RX ADMIN — Medication 1 DROP(S): at 22:21

## 2017-11-13 RX ADMIN — POLYETHYLENE GLYCOL 3350 17 GRAM(S): 17 POWDER, FOR SOLUTION ORAL at 13:57

## 2017-11-13 RX ADMIN — Medication 100 MILLIGRAM(S): at 22:23

## 2017-11-13 RX ADMIN — APIXABAN 2.5 MILLIGRAM(S): 2.5 TABLET, FILM COATED ORAL at 06:31

## 2017-11-13 RX ADMIN — Medication 100 MILLIGRAM(S): at 06:32

## 2017-11-13 RX ADMIN — Medication 1 DROP(S): at 06:32

## 2017-11-13 RX ADMIN — CARBIDOPA AND LEVODOPA 1 TABLET(S): 25; 100 TABLET ORAL at 23:16

## 2017-11-13 RX ADMIN — Medication 2000 UNIT(S): at 12:07

## 2017-11-13 RX ADMIN — AMLODIPINE BESYLATE 5 MILLIGRAM(S): 2.5 TABLET ORAL at 06:32

## 2017-11-13 RX ADMIN — SODIUM CHLORIDE 75 MILLILITER(S): 9 INJECTION INTRAMUSCULAR; INTRAVENOUS; SUBCUTANEOUS at 00:47

## 2017-11-13 RX ADMIN — CARBIDOPA AND LEVODOPA 1 TABLET(S): 25; 100 TABLET ORAL at 00:47

## 2017-11-13 RX ADMIN — CARBIDOPA AND LEVODOPA 1 TABLET(S): 25; 100 TABLET ORAL at 12:07

## 2017-11-13 RX ADMIN — Medication 100 MICROGRAM(S): at 06:32

## 2017-11-13 RX ADMIN — Medication 1 DROP(S): at 13:57

## 2017-11-13 RX ADMIN — LOSARTAN POTASSIUM 100 MILLIGRAM(S): 100 TABLET, FILM COATED ORAL at 06:32

## 2017-11-14 ENCOUNTER — TRANSCRIPTION ENCOUNTER (OUTPATIENT)
Age: 82
End: 2017-11-14

## 2017-11-14 VITALS
HEART RATE: 84 BPM | RESPIRATION RATE: 18 BRPM | OXYGEN SATURATION: 96 % | DIASTOLIC BLOOD PRESSURE: 85 MMHG | SYSTOLIC BLOOD PRESSURE: 138 MMHG | TEMPERATURE: 99 F

## 2017-11-14 LAB
BACTERIA UR CULT: SIGNIFICANT CHANGE UP
BUN SERPL-MCNC: 15 MG/DL — SIGNIFICANT CHANGE UP (ref 7–23)
CALCIUM SERPL-MCNC: 8.2 MG/DL — LOW (ref 8.4–10.5)
CHLORIDE SERPL-SCNC: 100 MMOL/L — SIGNIFICANT CHANGE UP (ref 98–107)
CO2 SERPL-SCNC: 23 MMOL/L — SIGNIFICANT CHANGE UP (ref 22–31)
CREAT SERPL-MCNC: 0.57 MG/DL — SIGNIFICANT CHANGE UP (ref 0.5–1.3)
GLUCOSE SERPL-MCNC: 97 MG/DL — SIGNIFICANT CHANGE UP (ref 70–99)
HCT VFR BLD CALC: 40.1 % — SIGNIFICANT CHANGE UP (ref 34.5–45)
HGB BLD-MCNC: 12.4 G/DL — SIGNIFICANT CHANGE UP (ref 11.5–15.5)
MCHC RBC-ENTMCNC: 21.4 PG — LOW (ref 27–34)
MCHC RBC-ENTMCNC: 30.9 % — LOW (ref 32–36)
MCV RBC AUTO: 69.1 FL — LOW (ref 80–100)
NRBC # FLD: 0 — SIGNIFICANT CHANGE UP
PLATELET # BLD AUTO: 344 K/UL — SIGNIFICANT CHANGE UP (ref 150–400)
PMV BLD: 10.1 FL — SIGNIFICANT CHANGE UP (ref 7–13)
POTASSIUM SERPL-MCNC: 3.7 MMOL/L — SIGNIFICANT CHANGE UP (ref 3.5–5.3)
POTASSIUM SERPL-SCNC: 3.7 MMOL/L — SIGNIFICANT CHANGE UP (ref 3.5–5.3)
RBC # BLD: 5.8 M/UL — HIGH (ref 3.8–5.2)
RBC # FLD: 18.1 % — HIGH (ref 10.3–14.5)
SODIUM SERPL-SCNC: 136 MMOL/L — SIGNIFICANT CHANGE UP (ref 135–145)
SPECIMEN SOURCE: SIGNIFICANT CHANGE UP
WBC # BLD: 9.62 K/UL — SIGNIFICANT CHANGE UP (ref 3.8–10.5)
WBC # FLD AUTO: 9.62 K/UL — SIGNIFICANT CHANGE UP (ref 3.8–10.5)

## 2017-11-14 PROCEDURE — 99239 HOSP IP/OBS DSCHRG MGMT >30: CPT

## 2017-11-14 RX ORDER — CARBIDOPA AND LEVODOPA 25; 100 MG/1; MG/1
1 TABLET ORAL
Qty: 0 | Refills: 0 | COMMUNITY

## 2017-11-14 RX ORDER — DENOSUMAB 60 MG/ML
60 INJECTION SUBCUTANEOUS
Qty: 0 | Refills: 0 | COMMUNITY

## 2017-11-14 RX ORDER — DENOSUMAB 60 MG/ML
0 INJECTION SUBCUTANEOUS
Qty: 0 | Refills: 0 | COMMUNITY

## 2017-11-14 RX ORDER — DOCUSATE SODIUM 100 MG
1 CAPSULE ORAL
Qty: 0 | Refills: 0 | COMMUNITY
Start: 2017-11-14

## 2017-11-14 RX ORDER — POLYETHYLENE GLYCOL 3350 17 G/17G
17 POWDER, FOR SOLUTION ORAL
Qty: 0 | Refills: 0 | COMMUNITY
Start: 2017-11-14

## 2017-11-14 RX ORDER — ONDANSETRON 8 MG/1
4 TABLET, FILM COATED ORAL
Qty: 0 | Refills: 0 | COMMUNITY
Start: 2017-11-14

## 2017-11-14 RX ORDER — LUBIPROSTONE 24 UG/1
3 CAPSULE, GELATIN COATED ORAL
Qty: 0 | Refills: 0 | COMMUNITY

## 2017-11-14 RX ORDER — CHOLECALCIFEROL (VITAMIN D3) 125 MCG
2000 CAPSULE ORAL
Qty: 0 | Refills: 0 | COMMUNITY
Start: 2017-11-14

## 2017-11-14 RX ORDER — SENNA PLUS 8.6 MG/1
2 TABLET ORAL
Qty: 0 | Refills: 0 | COMMUNITY
Start: 2017-11-14

## 2017-11-14 RX ORDER — SIMETHICONE 80 MG/1
1 TABLET, CHEWABLE ORAL
Qty: 0 | Refills: 0 | COMMUNITY
Start: 2017-11-14

## 2017-11-14 RX ADMIN — CARBIDOPA AND LEVODOPA 1 TABLET(S): 25; 100 TABLET ORAL at 11:41

## 2017-11-14 RX ADMIN — CARBIDOPA AND LEVODOPA 1 TABLET(S): 25; 100 TABLET ORAL at 05:46

## 2017-11-14 RX ADMIN — Medication 1 DROP(S): at 14:18

## 2017-11-14 RX ADMIN — APIXABAN 2.5 MILLIGRAM(S): 2.5 TABLET, FILM COATED ORAL at 05:46

## 2017-11-14 RX ADMIN — Medication 1 DROP(S): at 05:46

## 2017-11-14 RX ADMIN — Medication 100 MICROGRAM(S): at 05:46

## 2017-11-14 RX ADMIN — Medication 100 MILLIGRAM(S): at 05:46

## 2017-11-14 RX ADMIN — AMLODIPINE BESYLATE 5 MILLIGRAM(S): 2.5 TABLET ORAL at 05:46

## 2017-11-14 RX ADMIN — APIXABAN 2.5 MILLIGRAM(S): 2.5 TABLET, FILM COATED ORAL at 18:04

## 2017-11-14 RX ADMIN — POLYETHYLENE GLYCOL 3350 17 GRAM(S): 17 POWDER, FOR SOLUTION ORAL at 11:42

## 2017-11-14 RX ADMIN — Medication 2000 UNIT(S): at 11:40

## 2017-11-14 RX ADMIN — CARBIDOPA AND LEVODOPA 1 TABLET(S): 25; 100 TABLET ORAL at 18:04

## 2017-11-14 RX ADMIN — LOSARTAN POTASSIUM 100 MILLIGRAM(S): 100 TABLET, FILM COATED ORAL at 05:46

## 2017-11-14 RX ADMIN — Medication 100 MILLIGRAM(S): at 14:18

## 2017-11-14 NOTE — DISCHARGE NOTE ADULT - CARE PROVIDER_API CALL
Dino Pierson (JOHNATHAN), Geriatric Medicine; Internal Medicine  [address]  [place] [address]  Phone: [phone]  Fax: [phone]

## 2017-11-14 NOTE — PROGRESS NOTE ADULT - PROBLEM SELECTOR PLAN 3
Now resolved. Continue ondansetron PRN. Continue simethicone PRN for bloating.
Now resolved. Continue ondansetron PRN. Continue simethicone PRN for bloating.

## 2017-11-14 NOTE — PROGRESS NOTE ADULT - PROBLEM SELECTOR PLAN 2
FOBT negative from 2016 per [employer] (microcytosis is stable since that time)  per son, will be seeing gyn soon for increased endometrial thickness seen on CT abd/pelvis. TSH wnl. Check iron studies as outpatient.
FOBT negative from 2016 per [employer] (microcytosis is stable since that time)  per son, will be seeing gyn soon for increased endometrial thickness seen on CT abd/pelvis. TSH wnl. Check iron studies as outpatient.

## 2017-11-14 NOTE — PROGRESS NOTE ADULT - ATTENDING COMMENTS
Plan D/C to rehab, pt in agreement and will review facilities list with son. Time planning D/C 35 minutes.
Time on d/C 35 minutes.

## 2017-11-14 NOTE — DISCHARGE NOTE ADULT - MEDICATION SUMMARY - MEDICATIONS TO TAKE
I will START or STAY ON the medications listed below when I get home from the hospital:    Cozaar 100 mg oral tablet  -- 1 tab(s) by mouth once a day  -- Indication: For Essential hypertension    Eliquis 2.5 mg oral tablet  -- 1 tab(s) by mouth 2 times a day  -- Indication: For Atrial fibrillation    ondansetron 2 mg/mL injectable solution  -- 4 milligram(s) injectable every 8 hours, As Needed for nausea/vomiting  -- Indication: For Nausea and vomiting    Sinemet CR 25 mg-100 mg oral tablet, extended release  -- 1 tab(s) by mouth every 6 hours  -- Indication: For Parkinson disease    Prolia 60 mg/mL subcutaneous solution  -- 60 milligram(s) subcutaneous twice a year. Last given on 10/2017  -- Indication: For Osteoporosis    amLODIPine 5 mg oral tablet  -- 1 tab(s) by mouth once a day  -- Indication: For Essential hypertension    Amitiza 8 mcg oral capsule  -- 3 cap(s) by mouth 2 times a day,   -- Indication: For Chronic constipation    docusate sodium 100 mg oral capsule  -- 1 cap(s) by mouth 3 times a day, hold for loose stool  -- Indication: For Laxative    polyethylene glycol 3350 oral powder for reconstitution  -- 17 gram(s) by mouth once a day, hold for loose stool  -- Indication: For Laxative    senna oral tablet  -- 2 tab(s) by mouth once a day (at bedtime), As needed, Constipation, hold for loose stool  -- Indication: For Laxative    simethicone 80 mg oral tablet, chewable  -- 1 tab(s) by mouth 3 times a day, As needed, Gas  -- Indication: For gerd    Artificial Tears ophthalmic solution  -- 1 drop(s) to each affected eye 3 times a day  -- Indication: For Eye gtt    Synthroid 100 mcg (0.1 mg) oral tablet  -- 1 tab(s) by mouth once a day  -- Indication: For Hypothyroidism    cholecalciferol oral tablet  -- 2000 unit(s) by mouth once a day  -- Indication: For Vitamin

## 2017-11-14 NOTE — PROGRESS NOTE ADULT - PROBLEM SELECTOR PLAN 8
BP well controlled. C/w losartan, amlodipine. Monitor BP.
BP well controlled. C/w losartan, amlodipine. Monitor BP.

## 2017-11-14 NOTE — PROGRESS NOTE ADULT - ASSESSMENT
87-year-old female with afib (on apixaban), Parkinson disease, hypothyroidism, HTN, chronic constipation p/w abdominal bloating, nausea, and vomiting, found to qualify for subacute rehabilitation due to weakness.
87-year-old female with afib (on apixaban), Parkinson disease, hypothyroidism, HTN, chronic constipation p/w abdominal bloating, nausea, and vomiting, found to qualify for subacute rehabilitation due to weakness.

## 2017-11-14 NOTE — DISCHARGE NOTE ADULT - HOSPITAL COURSE
87-year-old female with afib (on Eloquis), Parkinson's, hypothyroid, HTN, chronic constipation p/w abdominal bloating, nausea, and vomiting, found to require rehabilitation due to weakness, admitted for placement in rehabilitation facility as patient lives alone and concern for possible falls at home.   Weakness.  Plan: Appreciate PT robbie, plan for rehab, will need to d/w CM in AM  Last fall was years ago, patient usually goes to the gym 5x/week, however per son, has been weaker of late, with decreased PO intake  fall precautions.  -urine cx contaminated   -plan for rehab   Microcytosis. Plan: FOBT negative from 2016 per [employer] (microcytosis is stable since that time)  per son, will be seeing gyn soon for increased endometrial thickness seen on CT abd/pelvis  TSH wnl  check iron studies as outpatient.   Non-intractable vomiting with nausea, unspecified vomiting type. Plan: resolved  ondansetron PRN  simethicone PRN for bloating.  Now improved and eating /drinking   Chronic constipation. Plan: will restart on Amitiza, can d/c if recurrent nausea or bloating worsens  c/w colace and senna PRN.  Parkinson disease. Plan: c/w Sinemet.  Hypothyroidism, unspecified type. Plan: c/w synthroid  TSH wnl.  Atrial fibrillation. Plan: c/w Eliquis  rate controlled.  Essential hypertension.  Plan: c/w losartan, amlodipine.   Need for prophylactic measure.  Plan: On Eliquis, no need for further pharmacologic DVT ppx.   Attending Statement:  Plan discussed with son and patient at bedside.  11/14- As per attending, patient stable for discharge.

## 2017-11-14 NOTE — PROGRESS NOTE ADULT - PROBLEM SELECTOR PROBLEM 3
Non-intractable vomiting with nausea, unspecified vomiting type
Non-intractable vomiting with nausea, unspecified vomiting type

## 2017-11-14 NOTE — PROGRESS NOTE ADULT - SUBJECTIVE AND OBJECTIVE BOX
Patient is a 87y old  Female who presents with a chief complaint of difficulty ambulating (2017 20:05)      SUBJECTIVE / OVERNIGHT EVENTS: Pt states nausea is infrequent and very mild. Eating well. No BM in last 2-3 days.    MEDICATIONS  (STANDING):  amLODIPine   Tablet 5 milliGRAM(s) Oral daily  apixaban 2.5 milliGRAM(s) Oral every 12 hours  artificial tears (preservative free) Ophthalmic Solution 1 Drop(s) Both EYES three times a day  carbidopa/levodopa  25/100 1 Tablet(s) Oral four times a day  cholecalciferol 2000 Unit(s) Oral daily  docusate sodium 100 milliGRAM(s) Oral three times a day  levothyroxine 100 MICROGram(s) Oral daily  losartan 100 milliGRAM(s) Oral daily  polyethylene glycol 3350 17 Gram(s) Oral daily  sodium chloride 0.9%. 1000 milliLiter(s) (75 mL/Hr) IV Continuous <Continuous>    MEDICATIONS  (PRN):  ondansetron Injectable 4 milliGRAM(s) IV Push every 8 hours PRN Nausea and/or Vomiting  senna 2 Tablet(s) Oral at bedtime PRN Constipation  simethicone 80 milliGRAM(s) Chew three times a day PRN Gas        CAPILLARY BLOOD GLUCOSE        I&O's Summary      PHYSICAL EXAM:  GENERAL: NAD, well-developed  HEAD:  Atraumatic, Normocephalic  EYES: EOMI, PERRLA, conjunctiva and sclera clear  NECK: Supple, No JVD  CHEST/LUNG: Clear to auscultation bilaterally; No wheeze  HEART: Regular rate and rhythm; No murmurs, rubs, or gallops  ABDOMEN: Soft, Nontender, Nondistended; Bowel sounds present  EXTREMITIES:  2+ Peripheral Pulses, No clubbing, cyanosis, or edema      LABS:                        12.4   9.62  )-----------( 344      ( 2017 06:00 )             40.1     11-14    136  |  100  |  15  ----------------------------<  97  3.7   |  23  |  0.57    Ca    8.2<L>      2017 06:00            Urinalysis Basic - ( 2017 09:22 )    Color: COLORL / Appearance: CLEAR / S.008 / pH: 7.0  Gluc: NEGATIVE / Ketone: NEGATIVE  / Bili: NEGATIVE / Urobili: NORMAL E.U.   Blood: NEGATIVE / Protein: NEGATIVE / Nitrite: NEGATIVE   Leuk Esterase: NEGATIVE / RBC: x / WBC 0-2   Sq Epi: OCC / Non Sq Epi: x / Bacteria: x        RADIOLOGY & ADDITIONAL TESTS:    Imaging Personally Reviewed:    Consultant(s) Notes Reviewed:      Care Discussed with Consultants/Other Providers:
Patient is a 87y old  Female who presents with a chief complaint of difficulty ambulating (2017 20:05)      SUBJECTIVE / OVERNIGHT EVENTS: No event overnight. Pt states her nausea is currently resolved, but if she eats too much it may return. Last bowel movement was about 2.5 days ago, which is close to her usual stool frequency. No abdominal pain.    MEDICATIONS  (STANDING):  amLODIPine   Tablet 5 milliGRAM(s) Oral daily  apixaban 2.5 milliGRAM(s) Oral every 12 hours  artificial tears (preservative free) Ophthalmic Solution 1 Drop(s) Both EYES three times a day  carbidopa/levodopa  25/100 1 Tablet(s) Oral four times a day  cholecalciferol 2000 Unit(s) Oral daily  docusate sodium 100 milliGRAM(s) Oral three times a day  levothyroxine 100 MICROGram(s) Oral daily  losartan 100 milliGRAM(s) Oral daily  sodium chloride 0.9%. 1000 milliLiter(s) (75 mL/Hr) IV Continuous <Continuous>    MEDICATIONS  (PRN):  ondansetron Injectable 4 milliGRAM(s) IV Push every 8 hours PRN Nausea and/or Vomiting  senna 2 Tablet(s) Oral at bedtime PRN Constipation  simethicone 80 milliGRAM(s) Chew three times a day PRN Gas        CAPILLARY BLOOD GLUCOSE        I&O's Summary      PHYSICAL EXAM:  GENERAL: NAD, well-developed  HEAD:  Atraumatic, Normocephalic  EYES: EOMI, PERRLA, conjunctiva and sclera clear  NECK: Supple, No JVD  CHEST/LUNG: Clear to auscultation bilaterally; No wheeze  HEART: Regular rate and rhythm; No murmurs, rubs, or gallops  ABDOMEN: Soft, Nontender, +mildly distended; Bowel sounds present  EXTREMITIES:  2+ Peripheral Pulses, No clubbing, cyanosis, or edema  PSYCH: AAOx3      LABS:                        11.5   11.09 )-----------( 341      ( 2017 07:35 )             38.6     -    138  |  102  |  13  ----------------------------<  114<H>  4.6   |  24  |  0.58    Ca    8.2<L>      2017 07:35    TPro  6.5  /  Alb  3.8  /  TBili  0.4  /  DBili  x   /  AST  12  /  ALT  5   /  AlkPhos  59  11-12          Urinalysis Basic - ( 2017 09:22 )    Color: COLORL / Appearance: CLEAR / S.008 / pH: 7.0  Gluc: NEGATIVE / Ketone: NEGATIVE  / Bili: NEGATIVE / Urobili: NORMAL E.U.   Blood: NEGATIVE / Protein: NEGATIVE / Nitrite: NEGATIVE   Leuk Esterase: NEGATIVE / RBC: x / WBC 0-2   Sq Epi: OCC / Non Sq Epi: x / Bacteria: x        RADIOLOGY & ADDITIONAL TESTS:    Imaging Personally Reviewed:    Consultant(s) Notes Reviewed:      Care Discussed with Consultants/Other Providers:

## 2017-11-14 NOTE — PROGRESS NOTE ADULT - PROBLEM SELECTOR PLAN 1
Appreciate PT robbie, plan for rehab. F/u with SW. Continue fall precautions.
Appreciate PT robbie, plan for rehab. F/u with SW. Continue fall precautions.

## 2017-11-14 NOTE — DISCHARGE NOTE ADULT - CARE PLAN
Principal Discharge DX:	Nausea and vomiting  Goal:	Followup with PMD and take all medications prescribed.  Instructions for follow-up, activity and diet:	Followup with PMD and take all medications prescribed. Low salt, low fat, low cholesterol diet  Secondary Diagnosis:	Weakness  Goal:	Followup with PMD and take all medications prescribed.  Instructions for follow-up, activity and diet:	Followup with PMD and take all medications prescribed. Low salt, low fat, low cholesterol diet  Secondary Diagnosis:	Hypothyroidism, unspecified type  Goal:	Followup with PMD and take all medications prescribed.  Instructions for follow-up, activity and diet:	Followup with PMD and take all medications prescribed. Low salt, low fat, low cholesterol diet  Secondary Diagnosis:	Parkinson disease  Goal:	Followup with PMD and take all medications prescribed.  Instructions for follow-up, activity and diet:	Followup with PMD and take all medications prescribed. Low salt, low fat, low cholesterol diet  Secondary Diagnosis:	Chronic constipation  Goal:	Followup with PMD and take all medications prescribed.  Instructions for follow-up, activity and diet:	Followup with PMD and take all medications prescribed. Low salt, low fat, low cholesterol diet  Secondary Diagnosis:	Atrial fibrillation  Goal:	Followup with PMD and take all medications prescribed.  Instructions for follow-up, activity and diet:	Followup with PMD and take all medications prescribed. Low salt, low fat, low cholesterol diet

## 2017-11-14 NOTE — PROGRESS NOTE ADULT - PROBLEM SELECTOR PLAN 9
IMPROVE = 2 (age, immobility). Already therapeutically anticoagulated. No additional VTE ppx needed.
IMPROVE = 2 (age, immobility). Already therapeutically anticoagulated. No additional VTE ppx needed.

## 2017-11-14 NOTE — DISCHARGE NOTE ADULT - SECONDARY DIAGNOSIS.
Weakness Hypothyroidism, unspecified type Parkinson disease Chronic constipation Atrial fibrillation

## 2017-11-14 NOTE — PROGRESS NOTE ADULT - PROBLEM SELECTOR PLAN 4
C/w colace, miralax, and senna PRN.
Plan to restart Amitiza, but will d/c if recurrent nausea or bloating worsens. C/w colace and senna PRN.

## 2017-11-14 NOTE — DISCHARGE NOTE ADULT - PATIENT PORTAL LINK FT
“You can access the [facility] Patient Portal, offered by [facility], by registering with the following website: [email or web address]”

## 2017-11-20 ENCOUNTER — APPOINTMENT (OUTPATIENT)
Dept: CARDIOLOGY | Facility: CLINIC | Age: 82
End: 2017-11-20

## 2017-12-15 ENCOUNTER — APPOINTMENT (OUTPATIENT)
Dept: INTERNAL MEDICINE | Facility: CLINIC | Age: 82
End: 2017-12-15
Payer: MEDICARE

## 2017-12-15 VITALS
OXYGEN SATURATION: 98 % | WEIGHT: 141 LBS | HEIGHT: 62 IN | SYSTOLIC BLOOD PRESSURE: 138 MMHG | BODY MASS INDEX: 25.95 KG/M2 | HEART RATE: 85 BPM | DIASTOLIC BLOOD PRESSURE: 92 MMHG

## 2017-12-15 DIAGNOSIS — E03.9 HYPOTHYROIDISM, UNSPECIFIED: ICD-10-CM

## 2017-12-15 DIAGNOSIS — I10 ESSENTIAL (PRIMARY) HYPERTENSION: ICD-10-CM

## 2017-12-15 DIAGNOSIS — G20 PARKINSON'S DISEASE: ICD-10-CM

## 2017-12-15 DIAGNOSIS — R05 COUGH: ICD-10-CM

## 2017-12-15 DIAGNOSIS — I48.0 PAROXYSMAL ATRIAL FIBRILLATION: ICD-10-CM

## 2017-12-15 PROCEDURE — 99214 OFFICE O/P EST MOD 30 MIN: CPT

## 2017-12-15 RX ORDER — LUBIPROSTONE 24 UG/1
24 CAPSULE, GELATIN COATED ORAL
Qty: 60 | Refills: 0 | Status: ACTIVE | COMMUNITY
Start: 2017-12-13

## 2017-12-15 RX ORDER — BENZONATATE 100 MG/1
100 CAPSULE ORAL
Qty: 42 | Refills: 0 | Status: ACTIVE | COMMUNITY
Start: 2017-12-15 | End: 1900-01-01

## 2017-12-15 RX ORDER — CARBIDOPA AND LEVODOPA 25; 100 MG/1; MG/1
25-100 TABLET, EXTENDED RELEASE ORAL
Qty: 120 | Refills: 0 | Status: DISCONTINUED | COMMUNITY
Start: 2017-12-13

## 2017-12-15 RX ORDER — LEVOCETIRIZINE DIHYDROCHLORIDE 5 MG/1
5 TABLET ORAL DAILY
Qty: 15 | Refills: 0 | Status: ACTIVE | COMMUNITY
Start: 2017-12-15 | End: 1900-01-01

## 2017-12-18 LAB — RAPID RVP RESULT: NOT DETECTED

## 2017-12-28 ENCOUNTER — INPATIENT (INPATIENT)
Facility: HOSPITAL | Age: 82
LOS: 1 days | End: 2017-12-30
Attending: HOSPITALIST | Admitting: HOSPITALIST
Payer: MEDICARE

## 2017-12-28 ENCOUNTER — EMERGENCY (EMERGENCY)
Facility: HOSPITAL | Age: 82
LOS: 1 days | Discharge: HOME | End: 2017-12-28
Attending: EMERGENCY MEDICINE | Admitting: EMERGENCY MEDICINE
Payer: MEDICARE

## 2017-12-28 VITALS
SYSTOLIC BLOOD PRESSURE: 95 MMHG | DIASTOLIC BLOOD PRESSURE: 65 MMHG | OXYGEN SATURATION: 99 % | RESPIRATION RATE: 16 BRPM | HEART RATE: 94 BPM | TEMPERATURE: 98 F

## 2017-12-28 VITALS
DIASTOLIC BLOOD PRESSURE: 90 MMHG | HEART RATE: 101 BPM | SYSTOLIC BLOOD PRESSURE: 130 MMHG | OXYGEN SATURATION: 100 % | RESPIRATION RATE: 16 BRPM

## 2017-12-28 VITALS
RESPIRATION RATE: 20 BRPM | OXYGEN SATURATION: 100 % | DIASTOLIC BLOOD PRESSURE: 81 MMHG | SYSTOLIC BLOOD PRESSURE: 116 MMHG | HEART RATE: 106 BPM | TEMPERATURE: 98 F

## 2017-12-28 DIAGNOSIS — R55 SYNCOPE AND COLLAPSE: ICD-10-CM

## 2017-12-28 LAB
ACANTHOCYTES BLD QL SMEAR: SLIGHT — SIGNIFICANT CHANGE UP
ALBUMIN SERPL ELPH-MCNC: 3.2 G/DL — LOW (ref 3.3–5)
ALBUMIN SERPL ELPH-MCNC: 3.3 G/DL — SIGNIFICANT CHANGE UP (ref 3.3–5)
ALP SERPL-CCNC: 65 U/L — SIGNIFICANT CHANGE UP (ref 40–120)
ALP SERPL-CCNC: 67 U/L — SIGNIFICANT CHANGE UP (ref 40–120)
ALT FLD-CCNC: 10 U/L — SIGNIFICANT CHANGE UP (ref 4–33)
ALT FLD-CCNC: 5 U/L — SIGNIFICANT CHANGE UP (ref 4–33)
ANISOCYTOSIS BLD QL: SLIGHT — SIGNIFICANT CHANGE UP
APPEARANCE UR: CLEAR — SIGNIFICANT CHANGE UP
APPEARANCE UR: CLEAR — SIGNIFICANT CHANGE UP
AST SERPL-CCNC: 10 U/L — SIGNIFICANT CHANGE UP (ref 4–32)
AST SERPL-CCNC: 14 U/L — SIGNIFICANT CHANGE UP (ref 4–32)
BASE EXCESS BLDV CALC-SCNC: -0.8 MMOL/L — SIGNIFICANT CHANGE UP
BASE EXCESS BLDV CALC-SCNC: -0.8 MMOL/L — SIGNIFICANT CHANGE UP
BASE EXCESS BLDV CALC-SCNC: 1.3 MMOL/L — SIGNIFICANT CHANGE UP
BASOPHILS # BLD AUTO: 0.02 K/UL — SIGNIFICANT CHANGE UP (ref 0–0.2)
BASOPHILS # BLD AUTO: 0.04 K/UL — SIGNIFICANT CHANGE UP (ref 0–0.2)
BASOPHILS NFR BLD AUTO: 0.2 % — SIGNIFICANT CHANGE UP (ref 0–2)
BASOPHILS NFR BLD AUTO: 0.3 % — SIGNIFICANT CHANGE UP (ref 0–2)
BASOPHILS NFR SPEC: 0.9 % — SIGNIFICANT CHANGE UP (ref 0–2)
BILIRUB SERPL-MCNC: 0.5 MG/DL — SIGNIFICANT CHANGE UP (ref 0.2–1.2)
BILIRUB SERPL-MCNC: 0.5 MG/DL — SIGNIFICANT CHANGE UP (ref 0.2–1.2)
BILIRUB UR-MCNC: NEGATIVE — SIGNIFICANT CHANGE UP
BILIRUB UR-MCNC: NEGATIVE — SIGNIFICANT CHANGE UP
BLASTS # FLD: 0 % — SIGNIFICANT CHANGE UP (ref 0–0)
BLOOD GAS VENOUS - CREATININE: 0.52 MG/DL — SIGNIFICANT CHANGE UP (ref 0.5–1.3)
BLOOD GAS VENOUS - CREATININE: 0.57 MG/DL — SIGNIFICANT CHANGE UP (ref 0.5–1.3)
BLOOD GAS VENOUS - CREATININE: 0.67 MG/DL — SIGNIFICANT CHANGE UP (ref 0.5–1.3)
BLOOD UR QL VISUAL: NEGATIVE — SIGNIFICANT CHANGE UP
BLOOD UR QL VISUAL: NEGATIVE — SIGNIFICANT CHANGE UP
BUN SERPL-MCNC: 20 MG/DL — SIGNIFICANT CHANGE UP (ref 7–23)
BUN SERPL-MCNC: 24 MG/DL — HIGH (ref 7–23)
CALCIUM SERPL-MCNC: 8.2 MG/DL — LOW (ref 8.4–10.5)
CALCIUM SERPL-MCNC: 8.6 MG/DL — SIGNIFICANT CHANGE UP (ref 8.4–10.5)
CHLORIDE BLDV-SCNC: 104 MMOL/L — SIGNIFICANT CHANGE UP (ref 96–108)
CHLORIDE BLDV-SCNC: 107 MMOL/L — SIGNIFICANT CHANGE UP (ref 96–108)
CHLORIDE BLDV-SCNC: 108 MMOL/L — SIGNIFICANT CHANGE UP (ref 96–108)
CHLORIDE SERPL-SCNC: 100 MMOL/L — SIGNIFICANT CHANGE UP (ref 98–107)
CHLORIDE SERPL-SCNC: 102 MMOL/L — SIGNIFICANT CHANGE UP (ref 98–107)
CK MB BLD-MCNC: 1.49 NG/ML — SIGNIFICANT CHANGE UP (ref 1–4.7)
CK MB BLD-MCNC: SIGNIFICANT CHANGE UP (ref 0–2.5)
CK SERPL-CCNC: 42 U/L — SIGNIFICANT CHANGE UP (ref 25–170)
CO2 SERPL-SCNC: 21 MMOL/L — LOW (ref 22–31)
CO2 SERPL-SCNC: 23 MMOL/L — SIGNIFICANT CHANGE UP (ref 22–31)
COLOR SPEC: YELLOW — SIGNIFICANT CHANGE UP
COLOR SPEC: YELLOW — SIGNIFICANT CHANGE UP
CREAT SERPL-MCNC: 0.64 MG/DL — SIGNIFICANT CHANGE UP (ref 0.5–1.3)
CREAT SERPL-MCNC: 0.66 MG/DL — SIGNIFICANT CHANGE UP (ref 0.5–1.3)
DACRYOCYTES BLD QL SMEAR: SLIGHT — SIGNIFICANT CHANGE UP
ELLIPTOCYTES BLD QL SMEAR: SLIGHT — SIGNIFICANT CHANGE UP
EOSINOPHIL # BLD AUTO: 0.02 K/UL — SIGNIFICANT CHANGE UP (ref 0–0.5)
EOSINOPHIL # BLD AUTO: 0.06 K/UL — SIGNIFICANT CHANGE UP (ref 0–0.5)
EOSINOPHIL NFR BLD AUTO: 0.2 % — SIGNIFICANT CHANGE UP (ref 0–6)
EOSINOPHIL NFR BLD AUTO: 0.5 % — SIGNIFICANT CHANGE UP (ref 0–6)
EOSINOPHIL NFR FLD: 0 % — SIGNIFICANT CHANGE UP (ref 0–6)
GAS PNL BLDV: 130 MMOL/L — LOW (ref 136–146)
GAS PNL BLDV: 134 MMOL/L — LOW (ref 136–146)
GAS PNL BLDV: 136 MMOL/L — SIGNIFICANT CHANGE UP (ref 136–146)
GIANT PLATELETS BLD QL SMEAR: PRESENT — SIGNIFICANT CHANGE UP
GLUCOSE BLDV-MCNC: 111 — HIGH (ref 70–99)
GLUCOSE BLDV-MCNC: 123 — HIGH (ref 70–99)
GLUCOSE BLDV-MCNC: 149 — HIGH (ref 70–99)
GLUCOSE SERPL-MCNC: 124 MG/DL — HIGH (ref 70–99)
GLUCOSE SERPL-MCNC: 140 MG/DL — HIGH (ref 70–99)
GLUCOSE UR-MCNC: NEGATIVE — SIGNIFICANT CHANGE UP
GLUCOSE UR-MCNC: NEGATIVE — SIGNIFICANT CHANGE UP
HCO3 BLDV-SCNC: 23 MMOL/L — SIGNIFICANT CHANGE UP (ref 20–27)
HCO3 BLDV-SCNC: 23 MMOL/L — SIGNIFICANT CHANGE UP (ref 20–27)
HCO3 BLDV-SCNC: 24 MMOL/L — SIGNIFICANT CHANGE UP (ref 20–27)
HCT VFR BLD CALC: 32.7 % — LOW (ref 34.5–45)
HCT VFR BLD CALC: 34.1 % — LOW (ref 34.5–45)
HCT VFR BLDV CALC: 17.6 % — CRITICAL LOW (ref 34.5–45)
HCT VFR BLDV CALC: 32.6 % — LOW (ref 34.5–45)
HCT VFR BLDV CALC: 33.5 % — LOW (ref 34.5–45)
HGB BLD-MCNC: 10.1 G/DL — LOW (ref 11.5–15.5)
HGB BLD-MCNC: 10.6 G/DL — LOW (ref 11.5–15.5)
HGB BLDV-MCNC: 10.6 G/DL — LOW (ref 11.5–15.5)
HGB BLDV-MCNC: 10.9 G/DL — LOW (ref 11.5–15.5)
HGB BLDV-MCNC: 5.6 G/DL — CRITICAL LOW (ref 11.5–15.5)
IMM GRANULOCYTES # BLD AUTO: 0.06 # — SIGNIFICANT CHANGE UP
IMM GRANULOCYTES # BLD AUTO: 0.07 # — SIGNIFICANT CHANGE UP
IMM GRANULOCYTES NFR BLD AUTO: 0.6 % — SIGNIFICANT CHANGE UP (ref 0–1.5)
IMM GRANULOCYTES NFR BLD AUTO: 0.6 % — SIGNIFICANT CHANGE UP (ref 0–1.5)
KETONES UR-MCNC: NEGATIVE — SIGNIFICANT CHANGE UP
KETONES UR-MCNC: NEGATIVE — SIGNIFICANT CHANGE UP
LACTATE BLDV-MCNC: 1.6 MMOL/L — SIGNIFICANT CHANGE UP (ref 0.5–2)
LACTATE BLDV-MCNC: 2.1 MMOL/L — HIGH (ref 0.5–2)
LACTATE BLDV-MCNC: 2.4 MMOL/L — HIGH (ref 0.5–2)
LEUKOCYTE ESTERASE UR-ACNC: HIGH
LEUKOCYTE ESTERASE UR-ACNC: NEGATIVE — SIGNIFICANT CHANGE UP
LYMPHOCYTES # BLD AUTO: 1.03 K/UL — SIGNIFICANT CHANGE UP (ref 1–3.3)
LYMPHOCYTES # BLD AUTO: 1.59 K/UL — SIGNIFICANT CHANGE UP (ref 1–3.3)
LYMPHOCYTES # BLD AUTO: 10.9 % — LOW (ref 13–44)
LYMPHOCYTES # BLD AUTO: 12.6 % — LOW (ref 13–44)
LYMPHOCYTES NFR SPEC AUTO: 18.7 % — SIGNIFICANT CHANGE UP (ref 13–44)
MACROCYTES BLD QL: SLIGHT — SIGNIFICANT CHANGE UP
MCHC RBC-ENTMCNC: 20.7 PG — LOW (ref 27–34)
MCHC RBC-ENTMCNC: 21.2 PG — LOW (ref 27–34)
MCHC RBC-ENTMCNC: 30.9 % — LOW (ref 32–36)
MCHC RBC-ENTMCNC: 31.1 % — LOW (ref 32–36)
MCV RBC AUTO: 67 FL — LOW (ref 80–100)
MCV RBC AUTO: 68.1 FL — LOW (ref 80–100)
METAMYELOCYTES # FLD: 0 % — SIGNIFICANT CHANGE UP (ref 0–1)
MICROCYTES BLD QL: SLIGHT — SIGNIFICANT CHANGE UP
MONOCYTES # BLD AUTO: 0.89 K/UL — SIGNIFICANT CHANGE UP (ref 0–0.9)
MONOCYTES # BLD AUTO: 1.13 K/UL — HIGH (ref 0–0.9)
MONOCYTES NFR BLD AUTO: 8.9 % — SIGNIFICANT CHANGE UP (ref 2–14)
MONOCYTES NFR BLD AUTO: 9.4 % — SIGNIFICANT CHANGE UP (ref 2–14)
MONOCYTES NFR BLD: 8.9 % — SIGNIFICANT CHANGE UP (ref 2–9)
MUCOUS THREADS # UR AUTO: SIGNIFICANT CHANGE UP
MUCOUS THREADS # UR AUTO: SIGNIFICANT CHANGE UP
MYELOCYTES NFR BLD: 0 % — SIGNIFICANT CHANGE UP (ref 0–0)
NEUTROPHIL AB SER-ACNC: 67 % — SIGNIFICANT CHANGE UP (ref 43–77)
NEUTROPHILS # BLD AUTO: 7.45 K/UL — HIGH (ref 1.8–7.4)
NEUTROPHILS # BLD AUTO: 9.75 K/UL — HIGH (ref 1.8–7.4)
NEUTROPHILS NFR BLD AUTO: 77.1 % — HIGH (ref 43–77)
NEUTROPHILS NFR BLD AUTO: 78.7 % — HIGH (ref 43–77)
NEUTS BAND # BLD: 1.8 % — SIGNIFICANT CHANGE UP (ref 0–6)
NITRITE UR-MCNC: NEGATIVE — SIGNIFICANT CHANGE UP
NITRITE UR-MCNC: NEGATIVE — SIGNIFICANT CHANGE UP
NON-SQ EPI CELLS # UR AUTO: 1 — SIGNIFICANT CHANGE UP
NRBC # FLD: 0 — SIGNIFICANT CHANGE UP
NRBC # FLD: 0 — SIGNIFICANT CHANGE UP
OTHER - HEMATOLOGY %: 0 — SIGNIFICANT CHANGE UP
OVALOCYTES BLD QL SMEAR: SIGNIFICANT CHANGE UP
PCO2 BLDV: 34 MMHG — LOW (ref 41–51)
PCO2 BLDV: 39 MMHG — LOW (ref 41–51)
PCO2 BLDV: 39 MMHG — LOW (ref 41–51)
PH BLDV: 7.4 PH — SIGNIFICANT CHANGE UP (ref 7.32–7.43)
PH BLDV: 7.43 PH — SIGNIFICANT CHANGE UP (ref 7.32–7.43)
PH BLDV: 7.44 PH — HIGH (ref 7.32–7.43)
PH UR: 6 — SIGNIFICANT CHANGE UP (ref 4.6–8)
PH UR: 6 — SIGNIFICANT CHANGE UP (ref 4.6–8)
PLATELET # BLD AUTO: 384 K/UL — SIGNIFICANT CHANGE UP (ref 150–400)
PLATELET # BLD AUTO: 401 K/UL — HIGH (ref 150–400)
PLATELET COUNT - ESTIMATE: NORMAL — SIGNIFICANT CHANGE UP
PMV BLD: 9.3 FL — SIGNIFICANT CHANGE UP (ref 7–13)
PMV BLD: 9.9 FL — SIGNIFICANT CHANGE UP (ref 7–13)
PO2 BLDV: 34 MMHG — LOW (ref 35–40)
PO2 BLDV: < 24 MMHG — LOW (ref 35–40)
PO2 BLDV: < 24 MMHG — LOW (ref 35–40)
POIKILOCYTOSIS BLD QL AUTO: SLIGHT — SIGNIFICANT CHANGE UP
POTASSIUM BLDV-SCNC: 3.8 MMOL/L — SIGNIFICANT CHANGE UP (ref 3.4–4.5)
POTASSIUM BLDV-SCNC: 3.9 MMOL/L — SIGNIFICANT CHANGE UP (ref 3.4–4.5)
POTASSIUM BLDV-SCNC: 4 MMOL/L — SIGNIFICANT CHANGE UP (ref 3.4–4.5)
POTASSIUM SERPL-MCNC: 4.1 MMOL/L — SIGNIFICANT CHANGE UP (ref 3.5–5.3)
POTASSIUM SERPL-MCNC: 4.4 MMOL/L — SIGNIFICANT CHANGE UP (ref 3.5–5.3)
POTASSIUM SERPL-SCNC: 4.1 MMOL/L — SIGNIFICANT CHANGE UP (ref 3.5–5.3)
POTASSIUM SERPL-SCNC: 4.4 MMOL/L — SIGNIFICANT CHANGE UP (ref 3.5–5.3)
PROMYELOCYTES # FLD: 0 % — SIGNIFICANT CHANGE UP (ref 0–0)
PROT SERPL-MCNC: 6.6 G/DL — SIGNIFICANT CHANGE UP (ref 6–8.3)
PROT SERPL-MCNC: 6.9 G/DL — SIGNIFICANT CHANGE UP (ref 6–8.3)
PROT UR-MCNC: 30 MG/DL — HIGH
PROT UR-MCNC: 30 MG/DL — HIGH
RBC # BLD: 4.88 M/UL — SIGNIFICANT CHANGE UP (ref 3.8–5.2)
RBC # BLD: 5.01 M/UL — SIGNIFICANT CHANGE UP (ref 3.8–5.2)
RBC # FLD: 17.3 % — HIGH (ref 10.3–14.5)
RBC # FLD: 17.4 % — HIGH (ref 10.3–14.5)
RBC CASTS # UR COMP ASSIST: HIGH (ref 0–?)
RBC CASTS # UR COMP ASSIST: SIGNIFICANT CHANGE UP (ref 0–?)
REVIEW TO FOLLOW: YES — SIGNIFICANT CHANGE UP
SAO2 % BLDV: 19.6 % — LOW (ref 60–85)
SAO2 % BLDV: 32.6 % — LOW (ref 60–85)
SAO2 % BLDV: 60.1 % — SIGNIFICANT CHANGE UP (ref 60–85)
SCHISTOCYTES BLD QL AUTO: SLIGHT — SIGNIFICANT CHANGE UP
SICKLE CELLS BLD QL SMEAR: SLIGHT — SIGNIFICANT CHANGE UP
SODIUM SERPL-SCNC: 136 MMOL/L — SIGNIFICANT CHANGE UP (ref 135–145)
SODIUM SERPL-SCNC: 137 MMOL/L — SIGNIFICANT CHANGE UP (ref 135–145)
SP GR SPEC: 1.02 — SIGNIFICANT CHANGE UP (ref 1–1.04)
SP GR SPEC: 1.03 — SIGNIFICANT CHANGE UP (ref 1–1.04)
SQUAMOUS # UR AUTO: SIGNIFICANT CHANGE UP
TROPONIN T SERPL-MCNC: < 0.06 NG/ML — SIGNIFICANT CHANGE UP (ref 0–0.06)
UROBILINOGEN FLD QL: NORMAL MG/DL — SIGNIFICANT CHANGE UP
UROBILINOGEN FLD QL: NORMAL MG/DL — SIGNIFICANT CHANGE UP
VARIANT LYMPHS # BLD: 2.7 % — SIGNIFICANT CHANGE UP
WBC # BLD: 12.64 K/UL — HIGH (ref 3.8–10.5)
WBC # BLD: 9.47 K/UL — SIGNIFICANT CHANGE UP (ref 3.8–10.5)
WBC # FLD AUTO: 12.64 K/UL — HIGH (ref 3.8–10.5)
WBC # FLD AUTO: 9.47 K/UL — SIGNIFICANT CHANGE UP (ref 3.8–10.5)
WBC UR QL: >50 — HIGH (ref 0–?)
WBC UR QL: SIGNIFICANT CHANGE UP (ref 0–?)

## 2017-12-28 PROCEDURE — 71020: CPT | Mod: 26

## 2017-12-28 PROCEDURE — 70450 CT HEAD/BRAIN W/O DYE: CPT | Mod: 26

## 2017-12-28 PROCEDURE — 99285 EMERGENCY DEPT VISIT HI MDM: CPT | Mod: 25

## 2017-12-28 PROCEDURE — 93010 ELECTROCARDIOGRAM REPORT: CPT | Mod: 77

## 2017-12-28 RX ORDER — CEPHALEXIN 500 MG
1 CAPSULE ORAL
Qty: 14 | Refills: 0 | OUTPATIENT
Start: 2017-12-28 | End: 2018-01-03

## 2017-12-28 RX ORDER — AMLODIPINE BESYLATE 2.5 MG/1
5 TABLET ORAL ONCE
Qty: 0 | Refills: 0 | Status: COMPLETED | OUTPATIENT
Start: 2017-12-28 | End: 2017-12-28

## 2017-12-28 RX ORDER — APIXABAN 2.5 MG/1
2.5 TABLET, FILM COATED ORAL ONCE
Qty: 0 | Refills: 0 | Status: COMPLETED | OUTPATIENT
Start: 2017-12-28 | End: 2017-12-28

## 2017-12-28 RX ORDER — SODIUM CHLORIDE 9 MG/ML
3 INJECTION INTRAMUSCULAR; INTRAVENOUS; SUBCUTANEOUS ONCE
Qty: 0 | Refills: 0 | Status: COMPLETED | OUTPATIENT
Start: 2017-12-28 | End: 2017-12-28

## 2017-12-28 RX ORDER — CARBIDOPA AND LEVODOPA 25; 100 MG/1; MG/1
1 TABLET ORAL ONCE
Qty: 0 | Refills: 0 | Status: COMPLETED | OUTPATIENT
Start: 2017-12-28 | End: 2017-12-28

## 2017-12-28 RX ORDER — LEVOTHYROXINE SODIUM 125 MCG
100 TABLET ORAL ONCE
Qty: 0 | Refills: 0 | Status: COMPLETED | OUTPATIENT
Start: 2017-12-28 | End: 2017-12-28

## 2017-12-28 RX ORDER — SODIUM CHLORIDE 9 MG/ML
500 INJECTION INTRAMUSCULAR; INTRAVENOUS; SUBCUTANEOUS ONCE
Qty: 0 | Refills: 0 | Status: COMPLETED | OUTPATIENT
Start: 2017-12-28 | End: 2017-12-28

## 2017-12-28 RX ORDER — LOSARTAN POTASSIUM 100 MG/1
100 TABLET, FILM COATED ORAL DAILY
Qty: 0 | Refills: 0 | Status: DISCONTINUED | OUTPATIENT
Start: 2017-12-28 | End: 2018-01-01

## 2017-12-28 RX ORDER — ACETAMINOPHEN 500 MG
650 TABLET ORAL ONCE
Qty: 0 | Refills: 0 | Status: COMPLETED | OUTPATIENT
Start: 2017-12-28 | End: 2017-12-28

## 2017-12-28 RX ADMIN — Medication 100 MICROGRAM(S): at 09:13

## 2017-12-28 RX ADMIN — LOSARTAN POTASSIUM 100 MILLIGRAM(S): 100 TABLET, FILM COATED ORAL at 09:13

## 2017-12-28 RX ADMIN — Medication 650 MILLIGRAM(S): at 17:51

## 2017-12-28 RX ADMIN — SODIUM CHLORIDE 3 MILLILITER(S): 9 INJECTION INTRAMUSCULAR; INTRAVENOUS; SUBCUTANEOUS at 08:00

## 2017-12-28 RX ADMIN — AMLODIPINE BESYLATE 5 MILLIGRAM(S): 2.5 TABLET ORAL at 09:13

## 2017-12-28 RX ADMIN — SODIUM CHLORIDE 500 MILLILITER(S): 9 INJECTION INTRAMUSCULAR; INTRAVENOUS; SUBCUTANEOUS at 17:24

## 2017-12-28 RX ADMIN — CARBIDOPA AND LEVODOPA 1 TABLET(S): 25; 100 TABLET ORAL at 09:13

## 2017-12-28 RX ADMIN — APIXABAN 2.5 MILLIGRAM(S): 2.5 TABLET, FILM COATED ORAL at 09:16

## 2017-12-28 NOTE — CONSULT NOTE ADULT - ATTENDING COMMENTS
Pt examined at bedside.  Able to give a cogent Hx. Recalls near syncopal event while eating oatmeal. Neuro exam non-focal and CT head neg. Few stigmata of PD.  Mild cogwheeling. Doubt seizure or TIA.  More likely near syncope due to dehydration.  R/O hemodynamic causes of syncope. Continue with Sinemet 25/100 qid.

## 2017-12-28 NOTE — ED PROVIDER NOTE - PROGRESS NOTE DETAILS
GLORIA = Dino Pierson. dylan shepherd; chest xray showing no pneumonia. urine positive leuks with many squamous however since pt symptomatic will treat and f/u culture. pt on tessalon will give 200 mg instead as symptoms persisting. return precautions given. pt and son agrees and understands plan.

## 2017-12-28 NOTE — ED PROVIDER NOTE - PLAN OF CARE
Rest, drink plenty of fluids.  Advance activity as tolerated.  Continue all previously prescribed medications as directed. Take Tessalon 200 mg as needed for cough every 8 hours- Do not drink/drive/operate machinery while on this medication, it can make you drowsy. Take Keflex 500 mg twice a day for 7 days. Follow up with your primary care physician in 48-72 hours- bring copies of your results.  Return to the Emergency Department for trouble breathing, worsening or persistent symptoms OR ANY NEW OR CONCERNING SYMPTOMS.

## 2017-12-28 NOTE — ED PROVIDER NOTE - ATTENDING CONTRIBUTION TO CARE
Pt was seen and evaluated by me. Pt was seen earlier today for a cough and found to have a URI. Pt states she went home and was eating and son reports she passed out. Son notes she was out for a few seconds and then slowly woke up with a left sided droop and then resolved. Pt states feeling weak but denies any vision changes or headache. Denies any SOB, chest pain, or abd pain. Lungs CTA b/l. RRR. Abd soft, non-tender. No focal deficits.

## 2017-12-28 NOTE — ED ADULT TRIAGE NOTE - CHIEF COMPLAINT QUOTE
Pt brought in by EMS from home s/p witnessed syncopal episode, denies hitting her head or LOC. Pt denies chest pain, SOB, dizziness , lightheadedness. Pt was discharged from [facility] a few hours ago. . Pt brought in by EMS from home s/p witnessed syncopal episode by son, who states pt did not hit her head.  Pt denies chest pain, SOB, dizziness , lightheadedness. Pt was discharged from [facility] a few hours ago. .

## 2017-12-28 NOTE — ED ADULT NURSE NOTE - OBJECTIVE STATEMENT
The patient is an 86 y/o female a&ox4 presenting s/p witnessed syncope by son who reports she did not hit her head.  Patient denies SOB, CP, GI/ symptoms,  N/V/D, fevers/chills, patient noted to be rectally febrile MD advised.  Patient denying dizziness, lightheadedness, palpitations.  VSS, patient in nad, will continue to monitor.

## 2017-12-28 NOTE — ED PROVIDER NOTE - PROGRESS NOTE DETAILS
Angel SHAW- STROKE code eval- 86 y/o F with h/o parkinson's disease recent diagnosis  if afib and just discharge from ED bibems after pt synopsized and son noted a transient left facial droop which has resolved. Pt has no facial droop in the ED, AOX3, 5/5 strength in upper ext . b/l lower ext weak 4/5 baseline parkinson's, no focal deficit, no code stroke indicated, will need to be evaluated for syncope

## 2017-12-28 NOTE — ED PROVIDER NOTE - CARE PLAN
Instructions for follow-up, activity and diet:	Rest, drink plenty of fluids.  Advance activity as tolerated.  Continue all previously prescribed medications as directed. Take Tessalon 200 mg as needed for cough every 8 hours- Do not drink/drive/operate machinery while on this medication, it can make you drowsy. Take Keflex 500 mg twice a day for 7 days. Follow up with your primary care physician in 48-72 hours- bring copies of your results.  Return to the Emergency Department for trouble breathing, worsening or persistent symptoms OR ANY NEW OR CONCERNING SYMPTOMS. Principal Discharge DX:	Viral upper respiratory tract infection  Instructions for follow-up, activity and diet:	Rest, drink plenty of fluids.  Advance activity as tolerated.  Continue all previously prescribed medications as directed. Take Tessalon 200 mg as needed for cough every 8 hours- Do not drink/drive/operate machinery while on this medication, it can make you drowsy. Take Keflex 500 mg twice a day for 7 days. Follow up with your primary care physician in 48-72 hours- bring copies of your results.  Return to the Emergency Department for trouble breathing, worsening or persistent symptoms OR ANY NEW OR CONCERNING SYMPTOMS.

## 2017-12-28 NOTE — ED ADULT NURSE NOTE - OBJECTIVE STATEMENT
Pt st" I have a cough for past 3 days and feel constipated....I also feel like I need to urinate frequently but only little bit comes out. " Son reports on&off low grade fevers past week. pt is axox3, calm pleasant affect. Skn is intact. Pt st" I use a walker at home...last fell 2013 frx pelvis." Pt with intermittent dry cough. Denies feeling short of breath....denies cp. Pt st" I have arthritis and my knees always hurt me....but I don't need any medicine for this pain. I always have body pains too." Pt postioned for comfort. vs as noted. sl and labs sent. Awaits xray. Pt instructed to call for asst need to obtain urine. Call bell in reach.

## 2017-12-28 NOTE — ED ADULT TRIAGE NOTE - CHIEF COMPLAINT QUOTE
c/o cough x 15 days and constipation x 3 days, polyuria and urgency. tmax temp 100 @ home.  Hx afib on eliquis, hypothyroid, HTN, parkinson's disease

## 2017-12-28 NOTE — CONSULT NOTE ADULT - SUBJECTIVE AND OBJECTIVE BOX
86yo woman PMH Afib on Eliquis, PD on Sinemet, HTN, hypothyroidism presents s/p syncopal episode. At baseline, pt       PAST MEDICAL & SURGICAL HISTORY:  Parkinson disease  Hypothyroid  Atrial fibrillation  Hypertension  No significant past surgical history      Allergies    Coumadin (Blisters)    HOME MEDICATION:       FAMILY HISTORY:  No pertinent family history in first degree relatives      Social History: Denies tob, EtOH use     Physical Exam:   Vital Signs Last 24 Hrs  T(C): 36.8 (28 Dec 2017 20:34), Max: 38 (28 Dec 2017 16:36)  T(F): 98.2 (28 Dec 2017 20:34), Max: 100.4 (28 Dec 2017 16:36)  HR: 105 (28 Dec 2017 20:34) (86 - 113)  BP: 120/79 (28 Dec 2017 20:34) (95/65 - 141/95)  BP(mean): --  RR: 17 (28 Dec 2017 20:34) (16 - 20)  SpO2: 97% (28 Dec 2017 20:34) (96% - 100%)    General Exam:   General appearance: No acute distress                 Neurological Exam:  Mental Status: AAOx3, fluent speech, follows simple commands, able to name  Cranial Nerves: EOMI, PERRL, VFF, V1-V3 intact, facial symmetric, no dysarthria, tongue midline  Motor: strength 5/5 throughout. No drift x4  Sensation: Intact to LT throughout  Coordination: FTN intact b/l  Reflexes: 1+ bilateral biceps, brachioradialis, patellar and ankle  Gait: normal and stable.      Labs:     CBC Full  -  ( 28 Dec 2017 17:00 )  WBC Count : 9.47 K/uL  Hemoglobin : 10.1 g/dL  Hematocrit : 32.7 %  Platelet Count - Automated : 384 K/uL  Mean Cell Volume : 67.0 fL  Mean Cell Hemoglobin : 20.7 pg  Mean Cell Hemoglobin Concentration : 30.9 %  Auto Neutrophil # : 7.45 K/uL  Auto Lymphocyte # : 1.03 K/uL  Auto Monocyte # : 0.89 K/uL  Auto Eosinophil # : 0.02 K/uL  Auto Basophil # : 0.02 K/uL  Auto Neutrophil % : 78.7 %  Auto Lymphocyte % : 10.9 %  Auto Monocyte % : 9.4 %  Auto Eosinophil % : 0.2 %  Auto Basophil % : 0.2 %        137  |  102  |  20  ----------------------------<  140<H>  4.1   |  23  |  0.64    Ca    8.2<L>      28 Dec 2017 17:00    TPro  6.6  /  Alb  3.2<L>  /  TBili  0.5  /  DBili  x   /  AST  10  /  ALT  5   /  AlkPhos  65  12-28    LIVER FUNCTIONS - ( 28 Dec 2017 17:00 )  Alb: 3.2 g/dL / Pro: 6.6 g/dL / ALK PHOS: 65 u/L / ALT: 5 u/L / AST: 10 u/L / GGT: x             Urinalysis Basic - ( 28 Dec 2017 16:17 )    Color: YELLOW / Appearance: CLEAR / S.024 / pH: 6.0  Gluc: NEGATIVE / Ketone: NEGATIVE  / Bili: NEGATIVE / Urobili: NORMAL mg/dL   Blood: NEGATIVE / Protein: 30 mg/dL / Nitrite: NEGATIVE   Leuk Esterase: NEGATIVE / RBC: 2-5 / WBC 2-5   Sq Epi: x / Non Sq Epi: x / Bacteria: x        Imaging:     CT Head:   No acute intracranial hemorrhage or mass effect. No displaced calvarial fracture. 88yo woman PMH Afib on Eliquis, PD on Sinemet, HTN, hypothyroidism presents s/p syncopal episode. Pt was recently discharged from [facility] after month-long stay. Post-discharge, pt had a cough that recently worsened over the past few days. Per pt's son at bedside, pt had persistent cough throughout the night last night and she was unable to sleep. She presented to the ED this morning at 5:30AM for evaluation of her cough. She was diagnosed with a URI and discharged home early in the afternoon. At home, at around 2PM, pt was eating her first meal of the day (last meal was 5/6PM the night before). While she was eating her oatmeal, she suddenly dropped her plate and dropped her head. Pt's son reports pt was responsive and followed simple commands, but was sluggish with possible L facial droop. Pt was placed on her side and EMS was called. Upon arrival of EMS about 4 min later, pt was still noted to be sluggish but more responsive and answering simple questions appropriately, with resolution of her facial droop. BP by EMS was 90s/60s. Upon arrival in the ED, pt was close to her baseline. Pt denies prodrome or recollection of the event. No associated shaking of limbs during the event per pt's son.     On ROS, pt c/o low grade fevers/chills for the past few days, with associated cough, nausea. She endorses chronic constipation and chronic intermittent RLE and LLE numbness/tingling. No HA, vision changes, new weakness. Currently pt ambulates with rollator.       PAST MEDICAL & SURGICAL HISTORY:  Parkinson disease  Hypothyroid  Atrial fibrillation  Hypertension  No significant past surgical history      Allergies  Coumadin (Blisters)    HOME MEDICATION:   Lubiprostone  Sinemet 25/100 1 tab 4x/day  Eliquis 2.5mg BID  Synthroid 100mcg daily  Amlodipine 5mg daily   Losartan 100mg daily     FAMILY HISTORY:  DM     Social History:   Denies tob, EtOH use      Physical Exam:   Vital Signs Last 24 Hrs  T(C): 36.8 (28 Dec 2017 20:34), Max: 38 (28 Dec 2017 16:36)  T(F): 98.2 (28 Dec 2017 20:34), Max: 100.4 (28 Dec 2017 16:36)  HR: 105 (28 Dec 2017 20:34) (86 - 113)  BP: 120/79 (28 Dec 2017 20:34) (95/65 - 141/95)  BP(mean): --  RR: 17 (28 Dec 2017 20:34) (16 - 20)  SpO2: 97% (28 Dec 2017 20:34) (96% - 100%)    Neuro Exam:   Mental Status: AAOx3, fluent speech, follows simple commands, able to name  Cranial Nerves: EOMI, PERRL, VFF, face symmetric, no dysarthria, tongue midline  Motor: strength 5/5 throughout. No drift x4, + b/l cogwheel rigidity at the wrists   Sensation: Intact to LT throughout  Coordination: FTN intact b/l  Reflexes: 3+ bilateral biceps and brachioradialis, 0 in b/l patellars and ankles      Labs:     CBC Full  -  ( 28 Dec 2017 17:00 )  WBC Count : 9.47 K/uL  Hemoglobin : 10.1 g/dL  Hematocrit : 32.7 %  Platelet Count - Automated : 384 K/uL  Mean Cell Volume : 67.0 fL  Mean Cell Hemoglobin : 20.7 pg  Mean Cell Hemoglobin Concentration : 30.9 %  Auto Neutrophil # : 7.45 K/uL  Auto Lymphocyte # : 1.03 K/uL  Auto Monocyte # : 0.89 K/uL  Auto Eosinophil # : 0.02 K/uL  Auto Basophil # : 0.02 K/uL  Auto Neutrophil % : 78.7 %  Auto Lymphocyte % : 10.9 %  Auto Monocyte % : 9.4 %  Auto Eosinophil % : 0.2 %  Auto Basophil % : 0.2 %        137  |  102  |  20  ----------------------------<  140<H>  4.1   |  23  |  0.64    Ca    8.2<L>      28 Dec 2017 17:00    TPro  6.6  /  Alb  3.2<L>  /  TBili  0.5  /  DBili  x   /  AST  10  /  ALT  5   /  AlkPhos  65      LIVER FUNCTIONS - ( 28 Dec 2017 17:00 )  Alb: 3.2 g/dL / Pro: 6.6 g/dL / ALK PHOS: 65 u/L / ALT: 5 u/L / AST: 10 u/L / GGT: x             Urinalysis Basic - ( 28 Dec 2017 16:17 )    Color: YELLOW / Appearance: CLEAR / S.024 / pH: 6.0  Gluc: NEGATIVE / Ketone: NEGATIVE  / Bili: NEGATIVE / Urobili: NORMAL mg/dL   Blood: NEGATIVE / Protein: 30 mg/dL / Nitrite: NEGATIVE   Leuk Esterase: NEGATIVE / RBC: 2-5 / WBC 2-5   Sq Epi: x / Non Sq Epi: x / Bacteria: x        Imaging:     CT Head:   No acute intracranial hemorrhage or mass effect. No displaced calvarial fracture.

## 2017-12-28 NOTE — ED PROVIDER NOTE - ATTENDING CONTRIBUTION TO CARE
MD Kelly:  patient seen and evaluated with the resident.  I was present for key portions of the History & Physical, and I agree with the Impression & Plan.  MD Kelly:  88 yo F, c/o 1 month cough.  Recently got home after prolonged stay at rehab for failure to thrive; admitted for low energy, fall risk, and generalized weakness.  No fevers/chills.  No CP/SOB.  Quality of cough is dry and hacking; no blood.  Better:  none.  Worse - none.  VS:  .  Afebrile.  Physical Exam: adult F, fatigued-appearing, +dry cough, NCAT, PERRL, RRR, +RLL crackles, Abd:  s/nd/nt, Ext:  no edema.  Impression:  viral URI, possible PNA.  HR merits concern for PNA and labwork, ECG, CXR, reassess. MD Kelly:  patient seen and evaluated with the PA.  I was present for key portions of the History & Physical, and I agree with the Impression & Plan.  MD Kelly:  86 yo F, c/o 1 month cough.  Recently got home after prolonged stay at rehab for failure to thrive; admitted for low energy, fall risk, and generalized weakness.  No fevers/chills.  No CP/SOB.  Quality of cough is dry and hacking; no blood.  Better:  none.  Worse - none.  VS:  .  Afebrile.  Physical Exam: adult F, fatigued-appearing, +dry cough, NCAT, PERRL, RRR, +RLL crackles, Abd:  s/nd/nt, Ext:  no edema.  Impression:  viral URI, possible PNA.  HR merits concern for PNA and labwork, ECG, CXR, reassess.

## 2017-12-28 NOTE — ED ADULT NURSE NOTE - CHIEF COMPLAINT QUOTE
Pt brought in by EMS from home s/p witnessed syncopal episode by son, who states pt did not hit her head.  Pt denies chest pain, SOB, dizziness , lightheadedness. Pt was discharged from [facility] a few hours ago. .

## 2017-12-28 NOTE — CONSULT NOTE ADULT - ASSESSMENT
86yo woman PMH Afib on Eliquis, PD on Sinemet, HTN, hypothyroidism presents s/p syncopal episode. Currently neuro exam nonfocal. DDx includes syncope (2/2 orthostasis 86yo woman PMH Afib on Eliquis, PD on Sinemet, HTN, hypothyroidism presents s/p syncopal episode. Currently neuro exam nonfocal. DDx includes syncope (2/2 orthostasis in setting of dehydration with decreased PO intake with possible dysautonomia 2/2 parkinsonism), vs r/o seizure given decreased awareness during event with no prodrome (however no post-ictal state so seizure less likely).     - syncope eval as per primary team  - check orthostatics  - encourage PO intake  - routine EEG - can be done as outpatient with her neurologist Dr. Tomlinson  - PT/OT eval  - supportive care as per primary team

## 2017-12-28 NOTE — ED PROVIDER NOTE - MEDICAL DECISION MAKING DETAILS
88 y/o female with history of A-fib with recent visit for cough and URI symptoms here with an episode of syncope. Concern for arrythmia. Labs, EKG.

## 2017-12-28 NOTE — ED PROVIDER NOTE - OBJECTIVE STATEMENT
86 y/o female with PMHx of A-fib, HTN, Hypothyroidism, and Parkinson's presents to ED for an episode of syncope. Pt was just seen here earlier today and was D/C home with a URI. Pt was reported to be eating and then passed out as per son. Pt was out for a few seconds then slowly woke up with a left sided facial droop that has since resolved. Pt denies any headache, vision changes, fever, chills, SOB, chest pain, or abd pain. Pt admits to some weakness.

## 2017-12-28 NOTE — ED PROVIDER NOTE - OBJECTIVE STATEMENT
87 y.o female pmhx of afib on eliquis, hypothyroidism, and parkinsons presenting with dry non productive cough since 12/13 since [facility]. Pt states it got better for the holidays but the past 3 days has been worse. Went to PMD who rx tessalon and antihistamine and had negative nasal swab. Denies fevers, chills, chest pain, SOB, n/v/d, abdominal pain, dysuria, numbness, tingling, weakness. Last BM 2 days ago.

## 2017-12-29 ENCOUNTER — APPOINTMENT (OUTPATIENT)
Dept: CARDIOLOGY | Facility: CLINIC | Age: 82
End: 2017-12-29

## 2017-12-29 DIAGNOSIS — E04.9 NONTOXIC GOITER, UNSPECIFIED: ICD-10-CM

## 2017-12-29 DIAGNOSIS — E03.9 HYPOTHYROIDISM, UNSPECIFIED: ICD-10-CM

## 2017-12-29 DIAGNOSIS — D64.9 ANEMIA, UNSPECIFIED: ICD-10-CM

## 2017-12-29 DIAGNOSIS — Z29.9 ENCOUNTER FOR PROPHYLACTIC MEASURES, UNSPECIFIED: ICD-10-CM

## 2017-12-29 DIAGNOSIS — I48.91 UNSPECIFIED ATRIAL FIBRILLATION: ICD-10-CM

## 2017-12-29 DIAGNOSIS — E86.0 DEHYDRATION: ICD-10-CM

## 2017-12-29 DIAGNOSIS — G20 PARKINSON'S DISEASE: ICD-10-CM

## 2017-12-29 DIAGNOSIS — I48.2 CHRONIC ATRIAL FIBRILLATION: ICD-10-CM

## 2017-12-29 DIAGNOSIS — I10 ESSENTIAL (PRIMARY) HYPERTENSION: ICD-10-CM

## 2017-12-29 DIAGNOSIS — R55 SYNCOPE AND COLLAPSE: ICD-10-CM

## 2017-12-29 DIAGNOSIS — R05 COUGH: ICD-10-CM

## 2017-12-29 LAB
BACTERIA UR CULT: SIGNIFICANT CHANGE UP
BACTERIA UR CULT: SIGNIFICANT CHANGE UP
BUN SERPL-MCNC: 19 MG/DL — SIGNIFICANT CHANGE UP (ref 7–23)
CALCIUM SERPL-MCNC: 8 MG/DL — LOW (ref 8.4–10.5)
CHLORIDE SERPL-SCNC: 106 MMOL/L — SIGNIFICANT CHANGE UP (ref 98–107)
CHOLEST SERPL-MCNC: 137 MG/DL — SIGNIFICANT CHANGE UP (ref 120–199)
CK SERPL-CCNC: 46 U/L — SIGNIFICANT CHANGE UP (ref 25–170)
CO2 SERPL-SCNC: 20 MMOL/L — LOW (ref 22–31)
CREAT SERPL-MCNC: 0.64 MG/DL — SIGNIFICANT CHANGE UP (ref 0.5–1.3)
FERRITIN SERPL-MCNC: 257.8 NG/ML — HIGH (ref 15–150)
GLUCOSE SERPL-MCNC: 112 MG/DL — HIGH (ref 70–99)
HBA1C BLD-MCNC: 5.8 % — HIGH (ref 4–5.6)
HCT VFR BLD CALC: 33.5 % — LOW (ref 34.5–45)
HDLC SERPL-MCNC: 48 MG/DL — SIGNIFICANT CHANGE UP (ref 45–65)
HGB BLD-MCNC: 10.3 G/DL — LOW (ref 11.5–15.5)
IRON SATN MFR SERPL: 16 UG/DL — LOW (ref 30–160)
IRON SATN MFR SERPL: 217 UG/DL — SIGNIFICANT CHANGE UP (ref 140–530)
LIPID PNL WITH DIRECT LDL SERPL: 83 MG/DL — SIGNIFICANT CHANGE UP
MAGNESIUM SERPL-MCNC: 2.2 MG/DL — SIGNIFICANT CHANGE UP (ref 1.6–2.6)
MCHC RBC-ENTMCNC: 20.9 PG — LOW (ref 27–34)
MCHC RBC-ENTMCNC: 30.7 % — LOW (ref 32–36)
MCV RBC AUTO: 67.8 FL — LOW (ref 80–100)
NRBC # FLD: 0 — SIGNIFICANT CHANGE UP
NT-PROBNP SERPL-SCNC: 2699 PG/ML — SIGNIFICANT CHANGE UP
NT-PROBNP SERPL-SCNC: 3025 PG/ML — SIGNIFICANT CHANGE UP
PLATELET # BLD AUTO: 410 K/UL — HIGH (ref 150–400)
PMV BLD: 10.1 FL — SIGNIFICANT CHANGE UP (ref 7–13)
POTASSIUM SERPL-MCNC: 4 MMOL/L — SIGNIFICANT CHANGE UP (ref 3.5–5.3)
POTASSIUM SERPL-SCNC: 4 MMOL/L — SIGNIFICANT CHANGE UP (ref 3.5–5.3)
RBC # BLD: 4.94 M/UL — SIGNIFICANT CHANGE UP (ref 3.8–5.2)
RBC # FLD: 17.4 % — HIGH (ref 10.3–14.5)
SODIUM SERPL-SCNC: 141 MMOL/L — SIGNIFICANT CHANGE UP (ref 135–145)
SPECIMEN SOURCE: SIGNIFICANT CHANGE UP
TRIGL SERPL-MCNC: 50 MG/DL — SIGNIFICANT CHANGE UP (ref 10–149)
TROPONIN T SERPL-MCNC: < 0.06 NG/ML — SIGNIFICANT CHANGE UP (ref 0–0.06)
TSH SERPL-MCNC: 0.91 UIU/ML — SIGNIFICANT CHANGE UP (ref 0.27–4.2)
UIBC SERPL-MCNC: 201 UG/DL — SIGNIFICANT CHANGE UP (ref 110–370)
WBC # BLD: 9.67 K/UL — SIGNIFICANT CHANGE UP (ref 3.8–10.5)
WBC # FLD AUTO: 9.67 K/UL — SIGNIFICANT CHANGE UP (ref 3.8–10.5)

## 2017-12-29 PROCEDURE — 71010: CPT | Mod: 26

## 2017-12-29 PROCEDURE — 93880 EXTRACRANIAL BILAT STUDY: CPT | Mod: 26

## 2017-12-29 PROCEDURE — 99223 1ST HOSP IP/OBS HIGH 75: CPT

## 2017-12-29 RX ORDER — APIXABAN 2.5 MG/1
2.5 TABLET, FILM COATED ORAL EVERY 12 HOURS
Qty: 0 | Refills: 0 | Status: DISCONTINUED | OUTPATIENT
Start: 2017-12-29 | End: 2017-12-30

## 2017-12-29 RX ORDER — SENNA PLUS 8.6 MG/1
2 TABLET ORAL AT BEDTIME
Qty: 0 | Refills: 0 | Status: DISCONTINUED | OUTPATIENT
Start: 2017-12-29 | End: 2017-12-30

## 2017-12-29 RX ORDER — METOPROLOL TARTRATE 50 MG
12.5 TABLET ORAL EVERY 12 HOURS
Qty: 0 | Refills: 0 | Status: DISCONTINUED | OUTPATIENT
Start: 2017-12-29 | End: 2017-12-30

## 2017-12-29 RX ORDER — CHOLECALCIFEROL (VITAMIN D3) 125 MCG
2000 CAPSULE ORAL DAILY
Qty: 0 | Refills: 0 | Status: DISCONTINUED | OUTPATIENT
Start: 2017-12-29 | End: 2017-12-30

## 2017-12-29 RX ORDER — SODIUM CHLORIDE 9 MG/ML
1000 INJECTION INTRAMUSCULAR; INTRAVENOUS; SUBCUTANEOUS
Qty: 0 | Refills: 0 | Status: DISCONTINUED | OUTPATIENT
Start: 2017-12-29 | End: 2017-12-30

## 2017-12-29 RX ORDER — AMLODIPINE BESYLATE 2.5 MG/1
5 TABLET ORAL DAILY
Qty: 0 | Refills: 0 | Status: DISCONTINUED | OUTPATIENT
Start: 2017-12-29 | End: 2017-12-29

## 2017-12-29 RX ORDER — DOCUSATE SODIUM 100 MG
100 CAPSULE ORAL THREE TIMES A DAY
Qty: 0 | Refills: 0 | Status: DISCONTINUED | OUTPATIENT
Start: 2017-12-29 | End: 2017-12-30

## 2017-12-29 RX ORDER — LOSARTAN POTASSIUM 100 MG/1
100 TABLET, FILM COATED ORAL DAILY
Qty: 0 | Refills: 0 | Status: DISCONTINUED | OUTPATIENT
Start: 2017-12-29 | End: 2017-12-29

## 2017-12-29 RX ORDER — LEVOTHYROXINE SODIUM 125 MCG
100 TABLET ORAL DAILY
Qty: 0 | Refills: 0 | Status: DISCONTINUED | OUTPATIENT
Start: 2017-12-29 | End: 2017-12-30

## 2017-12-29 RX ORDER — CARBIDOPA AND LEVODOPA 25; 100 MG/1; MG/1
1 TABLET ORAL EVERY 6 HOURS
Qty: 0 | Refills: 0 | Status: DISCONTINUED | OUTPATIENT
Start: 2017-12-29 | End: 2017-12-30

## 2017-12-29 RX ADMIN — CARBIDOPA AND LEVODOPA 1 TABLET(S): 25; 100 TABLET ORAL at 06:18

## 2017-12-29 RX ADMIN — Medication 2000 UNIT(S): at 11:42

## 2017-12-29 RX ADMIN — CARBIDOPA AND LEVODOPA 1 TABLET(S): 25; 100 TABLET ORAL at 17:11

## 2017-12-29 RX ADMIN — Medication 1 DROP(S): at 06:19

## 2017-12-29 RX ADMIN — Medication 100 MILLIGRAM(S): at 14:14

## 2017-12-29 RX ADMIN — Medication 1 DROP(S): at 14:14

## 2017-12-29 RX ADMIN — Medication 100 MILLIGRAM(S): at 06:18

## 2017-12-29 RX ADMIN — SODIUM CHLORIDE 75 MILLILITER(S): 9 INJECTION INTRAMUSCULAR; INTRAVENOUS; SUBCUTANEOUS at 06:17

## 2017-12-29 RX ADMIN — Medication 12.5 MILLIGRAM(S): at 07:41

## 2017-12-29 RX ADMIN — CARBIDOPA AND LEVODOPA 1 TABLET(S): 25; 100 TABLET ORAL at 11:41

## 2017-12-29 RX ADMIN — APIXABAN 2.5 MILLIGRAM(S): 2.5 TABLET, FILM COATED ORAL at 17:11

## 2017-12-29 RX ADMIN — Medication 100 MICROGRAM(S): at 06:18

## 2017-12-29 RX ADMIN — Medication 100 MILLIGRAM(S): at 21:25

## 2017-12-29 RX ADMIN — Medication 1 DROP(S): at 21:25

## 2017-12-29 RX ADMIN — APIXABAN 2.5 MILLIGRAM(S): 2.5 TABLET, FILM COATED ORAL at 10:41

## 2017-12-29 NOTE — H&P ADULT - PROBLEM SELECTOR PLAN 2
Continue with Sinemet daily  House neurology following and their recommendation appreciated -BUN/Cr consistent with dehydration as patient endorsed decreased PO intake. Patient s/p 1/2L in the ED   -Consider starting patient on gentle hydration at 75cc/hr for 10 hours and then re-evaluate -BUN/Cr consistent with dehydration as patient endorsed decreased PO intake. Patient s/p 1/2L in the ED   -Consider starting patient on gentle hydration at 75cc/hr for 8 hours and then re-evaluate -BUN/Cr consistent with dehydration as patient endorsed decreased PO intake. Patient s/p 1/2L in the ED   -gentle hydration at 75cc/hr x 10hrs

## 2017-12-29 NOTE — H&P ADULT - PROBLEM SELECTOR PLAN 8
-On Eliquis for Hx of atrial fibrillation -Will hold antihypertensive medications as above   -DASH diet recommended

## 2017-12-29 NOTE — PROGRESS NOTE ADULT - PROBLEM SELECTOR PLAN 2
-BUN/Cr consistent with dehydration as patient endorsed decreased PO intake. Patient s/p 1/2L in the ED   -gentle hydration at 75cc/hr x 10hrs  Encourage PO intake

## 2017-12-29 NOTE — H&P ADULT - RS GEN PE MLT RESP DETAILS PC
no chest wall tenderness/respirations non-labored/no wheezes/airway patent/no rhonchi/no intercostal retractions/no rales/normal/clear to auscultation bilaterally/good air movement/breath sounds equal

## 2017-12-29 NOTE — PROGRESS NOTE ADULT - PROBLEM SELECTOR PLAN 1
Multifactorial etiology including dehydration, HTN over-treatment, side-effect of Sinemet, deconditioning and current URI vs Afib with RVR or other cardiac etiology;   Carotid dopplers -No hemodynamically significant stenoses  CK/Trops x 2 negative    Amlodipine and Cozaar in hold as SBP in 110s   Appreciate House neurology -Doubt seizure or TIA.  More likely near syncope due to dehydration. Outpt EEG  Await Orthostatics ordered   -TTE to evaluate LVEF or any valvular disease    Await PT/OT consult ordered

## 2017-12-29 NOTE — H&P ADULT - PROBLEM SELECTOR PLAN 4
Continue with Eliquis for anticoagulation   Currently rate controlled -Continue with Sinemet daily  -House neurology following and their recommendation appreciated -Check TSH  -US Thyroid as outpt with PMD

## 2017-12-29 NOTE — H&P ADULT - NEGATIVE CARDIOVASCULAR SYMPTOMS
no chest pain/no dyspnea on exertion/no peripheral edema/no palpitations/no paroxysmal nocturnal dyspnea/no orthopnea

## 2017-12-29 NOTE — H&P ADULT - NEGATIVE NEUROLOGICAL SYMPTOMS
no transient paralysis/no focal seizures/no tremors/no headache/no hemiparesis/no generalized seizures/no loss of sensation/no difficulty walking/no confusion/no paresthesias/no vertigo

## 2017-12-29 NOTE — H&P ADULT - GASTROINTESTINAL DETAILS
soft/nontender/no rebound tenderness/no rigidity/no distention/no guarding/bowel sounds normal/normal

## 2017-12-29 NOTE — PROGRESS NOTE ADULT - ASSESSMENT
88 y/o Female with Hx of Afib (on Eliquis), Parkinson Disease, HTN, Hypothyroidism, [facility] of multifactorial etiology; Afib with RVR; Found to have enlarged thyroid on PE;

## 2017-12-29 NOTE — H&P ADULT - MOTOR
5/5 strength in all four extremities, b/l cogwheel rigidity at the wrists 5/5 strength in RUE and LUE  4/5 strength in RLE and LLE   Mild baseline tremor noted in bilateral arms

## 2017-12-29 NOTE — H&P ADULT - NEGATIVE ENMT SYMPTOMS
no sinus symptoms/no vertigo/no nasal congestion/no nasal discharge/no tinnitus/no hearing difficulty/no ear pain

## 2017-12-29 NOTE — PROGRESS NOTE ADULT - PROBLEM SELECTOR PLAN 3
[id number] risk stratification score 4  Continue with Eliquis /  Metoprolol 12.5mg po bid, up titer for HR and BP control   -Hold Amlodipine and Cozaar as above

## 2017-12-29 NOTE — H&P ADULT - ASSESSMENT
86 y/o F with PMH of Parkinson's disease, Afib(on Eliquis), HTN, Hypothyroidism presented with an episode of syncope 88 y/o Female with Hx of Afib (on Eliquis), Parkinson Disease, HTN, Hypothyroidism, recently discharged from [facility] a/w syncopal episode of multifactorial etiology; 88 y/o Female with Hx of Afib (on Eliquis), Parkinson Disease, HTN, Hypothyroidism, [facility] of [facility]actor[facility] etiology; Afib with RVR; Found to have enlarged thyroid on PE;

## 2017-12-29 NOTE — OCCUPATIONAL THERAPY INITIAL EVALUATION ADULT - PERTINENT HX OF CURRENT PROBLEM, REHAB EVAL
86 y/o F with Hx of Afib (on Eliquis), PD, HTN, Hypothyroidism presented s/p syncopal episode. At home, at around 2PM, pt was eating her first meal of the day. She suddenly dropped her plate and dropped her head. Pt stated that she had LOC of about few seconds, but was confused initially which resolved by itself.  Pt stated that when she "came about" her son noticed that she had left sided facial droop. Son then called EMS. (see below)

## 2017-12-29 NOTE — H&P ADULT - NSHPOUTPATIENTPROVIDERS_GEN_ALL_CORE
Dr. Dino Pierson-PCP   Dr. Madison-Cardiologist   Dr. Rajendra Tomlinson-Neurologist   Dr. Young-GI Dr. Dino Pierson - PCP, Walker Madison - Cardiologist   Dr. Rajendra Tomlinson-Neurologist   Dr. Young-GI

## 2017-12-29 NOTE — OCCUPATIONAL THERAPY INITIAL EVALUATION ADULT - LEVEL OF INDEPENDENCE: DRESS UPPER BODY, OT EVAL
[id number] @ [facility] 37w3d presents for ob visit.Fetus is active. Having occasional sharp vaginal pains    Visit Vitals  /78   Ht 5' 7.99\" (1.727 m)   Wt 79.8 kg   LMP 2016   BMI 26.77 kg/m²      General Appearance: well developed, well nourished.  Patient is alert and oriented times three.    Abdomen gravid, soft, NT  Fundal Ht 36cm  FHT's 135-140        Assessment and Plan:  1. 32 year old  at 37w3d   2. GBS neg  3. Declines Tdap until after delivery  4. Return to clinic in 1 week for a routine OB visit.    Kimmie Coulter MD        
independent

## 2017-12-29 NOTE — H&P ADULT - PROBLEM SELECTOR PLAN 6
On Eliquis for Hx of atrial fibrillation -Continue with Eliquis for anticoagulation   -Currently rate controlled -Continue with Sinemet daily  -House neurology following and their recommendation appreciated

## 2017-12-29 NOTE — H&P ADULT - PROBLEM SELECTOR PLAN 3
Continue with Synthroid daily   TSH level in am -H&H: 10.1/32.7 with low MCV(67.0)  -Will check full Iron panel  -Check fecal occult [id number] risk stratification score 4  -Cards c/s in am - Pt of Dr. Sonja Madison  -Continue with Eliquis for anticoagulation   -Start rate controlling agent now: Metoprolol 12.5mg po bid, up titer for HR and BP control   -Hold Amlodipine and Cozaar as above

## 2017-12-29 NOTE — PHYSICAL THERAPY INITIAL EVALUATION ADULT - PERTINENT HX OF CURRENT PROBLEM, REHAB EVAL
86 y/o Female with Hx of Afib (on Eliquis), Parkinson Disease, HTN, Hypothyroidism, [facility] of multifactorial etiology; Afib with RVR; Found to have enlarged thyroid on PE

## 2017-12-29 NOTE — H&P ADULT - HISTORY OF PRESENT ILLNESS
86 y/o F with PMH of Afib(on Eliquis), Parkinson Disease, HTN, Hypothyroidism presented to the ED for syncopal episode yesterday. Patient was recently discharged from [place] after month-long stay. Post-discharge, patient had persistent cough that recently worsened over the past few days. Patient had persistent cough throughout the night last night and she was unable to sleep. She presented to the ED yesterday morning at 5:30AM for evaluation of her cough. She was diagnosed with a URI and discharged home early in the afternoon. At home, at around 2PM, patient was eating her first meal of the day (last meal was 5/6PM the night before). While she was eating her oatmeal, she suddenly dropped her plate and dropped her head. Patient stated that she had LOC of about few seconds, but was confused initially which resolved by itself. Patient denied any falls or head trauma, seizure like activity, tongue laceration, bowel or bladder incontinence. Patient stated that when she "came about" her son noticed that she had left sided facial droop. Son then called EMS. Upon arrival of EMS about 4 min later, patient was still noted to be sluggish but more responsive and answering simple questions appropriately, with resolution of her facial droop. BP by EMS was 90s/60s. Patient endorsed chest pain when she coughs. Patient denied any fevers, chills, N/V/D/C, abdominal pain, melena, hematochezia, recent travel, sick contact, dysuria, pleuritic or positional chest pain.      On ED admission EKG revealed Atrial flutter with variable AV block, LAD at a rate of 99 with Qtc of 438 and TWI in lead I, AVL, CE x 1: Negative, H&H: 10.1/32.7, Gluc: 140, Ca: 8.2, Alb: 3.2, UA: Negative. CT head: No acute intracranial hemorrhage or mass effect. No displaced calvarial fracture. Patient was seen by neurology who recommended "syncope eval as per primary team, Check orthostatics, encourage PO intake. Routine EEG - can be done as outpatient with her neurologist Dr. Tomlinson, PT/OT eval, supportive care as per primary team". When examined patient is resting in the stretcher and denied any current complaints. 88 y/o Female with Hx of Afib (on Eliquis), Parkinson Disease, HTN, Hypothyroidism presented to the ED for syncopal episode yesterday. Patient was recently discharged from [facility] after month-long stay. Post-discharge, patient had persistent cough that recently worsened over the past few days. Patient had persistent cough throughout the night last night and she was unable to sleep. She presented to the ED yesterday morning at 5:30AM for evaluation of her cough. She was diagnosed with a URI and discharged home early in the afternoon. At home, at around 2PM, patient was eating her first meal of the day (last meal was 5/6PM the night before). While she was eating her oatmeal, she suddenly dropped her plate and dropped her head. Patient stated that she had LOC of about few seconds, but was confused initially which resolved by itself. Patient denied any falls or head trauma, seizure like activity, tongue laceration, bowel or bladder incontinence. Patient stated that when she "came about" her son noticed that she had left sided facial droop. Son then called EMS. Upon arrival of EMS about 4 min later, patient was still noted to be sluggish but more responsive and answering simple questions appropriately, with resolution of her facial droop. BP by EMS was 90s/60s. Patient endorsed chest pain when she coughs. Patient denied any fevers, chills, N/V/D/C, abdominal pain, melena, hematochezia, recent travel, sick contact, dysuria, pleuritic or positional chest pain.      On ED admission EKG revealed Atrial flutter with variable AV block, LAD at a rate of 99 with Qtc of 438 and TWI in lead I, AVL, CE x 1: Negative, H&H: 10.1/32.7, Gluc: 140, Ca: 8.2, Alb: 3.2, UA: Negative. CT head: No acute intracranial hemorrhage or mass effect. No displaced calvarial fracture. Patient was seen by neurology who recommended "syncope eval as per primary team, Check orthostatics, encourage PO intake. Routine EEG - can be done as outpatient with her neurologist Dr. Tomlinson, PT/OT eval, supportive care as per primary team". When examined patient is resting in the stretcher and denied any current complaints.

## 2017-12-29 NOTE — H&P ADULT - NEGATIVE MUSCULOSKELETAL SYMPTOMS
no neck pain/no myalgia/no arthralgia/no arthritis/no muscle cramps/no muscle weakness/no joint swelling/no stiffness

## 2017-12-29 NOTE — H&P ADULT - NSHPLABSRESULTS_GEN_ALL_CORE
10.1   9.47  )-----------( 384      ( 28 Dec 2017 17:00 )             32.7     12-28    137  |  102  |  20  ----------------------------<  140<H>  4.1   |  23  |  0.64    Ca    8.2<L>      28 Dec 2017 17:00    TPro  6.6  /  Alb  3.2<L>  /  TBili  0.5  /  DBili  x   /  AST  10  /  ALT  5   /  AlkPhos  65  12-28    CARDIAC MARKERS ( 28 Dec 2017 17:00 )  x     / < 0.06 ng/mL / 42 u/L / 1.49 ng/mL / x        CT head: No acute intracranial hemorrhage or mass effect. No displaced calvarial fracture.     EKG: Atrial flutter with variable AV block, LAD at a rate of 99 with Qtc of 438 and TWI in lead I, AVL 10.1   9.47  )-----------( 384      ( 28 Dec 2017 17:00 )             32.7     12-28    137  |  102  |  20  ----------------------------<  140<H>  4.1   |  23  |  0.64    Ca    8.2<L>      28 Dec 2017 17:00    TPro  6.6  /  Alb  3.2<L>  /  TBili  0.5  /  DBili  x   /  AST  10  /  ALT  5   /  AlkPhos  65  12-28    CARDIAC MARKERS ( 28 Dec 2017 17:00 )  x     / < 0.06 ng/mL / 42 u/L / 1.49 ng/mL / x        CT head: No acute intracranial hemorrhage or mass effect. No displaced calvarial fracture.     EKG: Atrial flutter with variable AV block, LAD, 99bpm, Qtc 438 and TWI in lead I, AVL, no acute ST changes - my reading 10.1   9.47  )-----------( 384      ( 28 Dec 2017 17:00 )             32.7     12-28    137  |  102  |  20  ----------------------------<  140<H>  4.1   |  23  |  0.64    Ca    8.2<L>      28 Dec 2017 17:00    TPro  6.6  /  Alb  3.2<L>  /  TBili  0.5  /  DBili  x   /  AST  10  /  ALT  5   /  AlkPhos  65  12-28    CARDIAC MARKERS ( 28 Dec 2017 17:00 )  x     / < 0.06 ng/mL / 42 u/L / 1.49 ng/mL / x        CT head: No acute intracranial hemorrhage or mass effect. No displaced calvarial fracture.     EKG: Atrial flutter with variable AV block, LAD, 99bpm, Qtc 438 and TWI in lead I, AVL, no acute ST changes - my reading    CXR,12/28/17:  The lungs are free of focal consolidations. Heart size is stable. Tortuosity of the aorta. No definite effusions.

## 2017-12-29 NOTE — H&P ADULT - PROBLEM SELECTOR PLAN 1
Differential include possible decreased PO intake vs deconditioning vs possible infectious etiology   Admit to telemetry, serial CE's, serial EKG  HgbA1C, TSH, lipid profile, CBC, CMP in am   Consider TTE in am to evaluate LVEF   Orthostatics ordered, if positive will start patient on maintenance IVF   Fall precautions ordered  House neurology following and their recommendation appreciated   PT/OT consult ordered  Vital signs q4hrs -Differential include possible decreased PO intake vs deconditioning vs possible infectious etiology(supposed URI symptoms earlier in the ED)  -Monitor on telemetry, serial CE's, serial EKG  -HgbA1C, TSH, lipid profile, CBC, CMP in am   -Consider TTE in am to evaluate LVEF   -Orthostatics ordered, if positive will start patient on maintenance IVF   -Fall precautions ordered  -House neurology following and their recommendation noted  -PT/OT consult ordered  -Vital signs q4hrs -Differential include possible decreased PO intake vs deconditioning vs possible infectious etiology(supposed URI symptoms earlier in the ED)  -Monitor on telemetry, serial CE's, serial EKG  -HgbA1C, TSH, lipid profile, CBC, CMP in am   -TTE in am to evaluate LVEF or any valvular disease    -Orthostatics ordered  -Fall precautions ordered  -House neurology following and their recommendation noted  -PT/OT consult ordered  -Vital signs q4hrs  -Carotid dopplers to r/o any underline MARIANA  -Will hold off on EEG for now as patient denied any seizure episodes Multifactorial etiology including dehydration, HTN over-treatment, side-effect of Sinemet, deconditioning and current URI vs Afib with RVR or other cardiac etiology;   -Monitor on telemetry, serial CE's, serial EKG  -HgbA1C, TSH, lipid profile, CBC, CMP in am   -TTE in am to evaluate LVEF or any valvular disease    -Unclear why not on heart rate- and HTN- controlling agent   -HOLD  Amlodipine and Cozaar   -Orthostatics ordered  -Fall precautions ordered  -House neurology following  -PT/OT consult ordered  -Vital signs q4hrs  -Carotid dopplers to r/o any underline MARIANA  -Hold off on EEG - doubt this was a seizure episode

## 2017-12-29 NOTE — OCCUPATIONAL THERAPY INITIAL EVALUATION ADULT - PLANNED THERAPY INTERVENTIONS, OT EVAL
bed mobility training/ROM/balance training/ADL retraining/neuromuscular re-education/strengthening/transfer training

## 2017-12-29 NOTE — H&P ADULT - NEGATIVE OPHTHALMOLOGIC SYMPTOMS
no discharge R/no lacrimation L/no lacrimation R/no discharge L/no diplopia/no photophobia/no blurred vision L/no blurred vision R

## 2017-12-29 NOTE — H&P ADULT - PROBLEM SELECTOR PLAN 5
Continue with antihypertensive medications   DASH diet recommended -Continue with Synthroid daily   -TSH level in am -H&H: 10.1/32.7 with low MCV(67.0)  -Will check full Iron panel  -Check fecal occult

## 2017-12-29 NOTE — H&P ADULT - NEGATIVE GASTROINTESTINAL SYMPTOMS
no melena/no hematochezia/no diarrhea/no change in bowel habits/no vomiting/no abdominal pain/no constipation/no nausea

## 2017-12-29 NOTE — H&P ADULT - PROBLEM SELECTOR PLAN 7
-Continue with antihypertensive medications   -DASH diet recommended -Will hold antihypertensive medications for now(patient likely over took her antihypertensive medications and with decreased PO intake which could have caused her to syncopize)    -DASH diet recommended -Continue with Synthroid daily   -TSH level in am as above

## 2017-12-30 RX ADMIN — CARBIDOPA AND LEVODOPA 1 TABLET(S): 25; 100 TABLET ORAL at 00:47

## 2017-12-30 NOTE — ED POST DISCHARGE NOTE - RESULT SUMMARY
UCX: Kevin gibbs coag. neg.  No S/R profile.  Pt discharged on Keflex 500mg BID x 7 days.  Appropriate care in ED.  No call back necessary.

## 2017-12-30 NOTE — DISCHARGE NOTE FOR THE EXPIRED PATIENT - HOSPITAL COURSE
88 y/o Female with Hx of Afib (on Eliquis), Parkinson Disease, HTN, Hypothyroidism presented to the ED for syncopal episode yesterday. Patient was recently discharged from [facility] after month-long stay. Post-discharge, patient had persistent cough that recently worsened over the past few days. Patient had persistent cough throughout the night last night and she was unable to sleep. She presented to the ED yesterday morning at 5:30AM for evaluation of her cough. She was diagnosed with a URI and discharged home early in the afternoon. At home, at around 2PM, patient was eating her first meal of the day (last meal was 5/6PM the night before). While she was eating her oatmeal, she suddenly dropped her plate and dropped her head. Patient stated that she had LOC of about few seconds, but was confused initially which resolved by itself. Patient denied any falls or head trauma, seizure like activity, tongue laceration, bowel or bladder incontinence. Patient stated that when she "came about" her son noticed that she had left sided facial droop. Son then called EMS. Upon arrival of EMS about 4 min later, patient was still noted to be sluggish but more responsive and answering simple questions appropriately, with resolution of her facial droop. BP by EMS was 90s/60s. Patient endorsed chest pain when she coughs. Patient denied any fevers, chills, N/V/D/C, abdominal pain, melena, hematochezia, recent travel, sick contact, dysuria, pleuritic or positional chest pain.      On ED admission EKG revealed Atrial flutter with variable AV block, LAD at a rate of 99 with Qtc of 438 and TWI in lead I, AVL, CE x 1: Negative, H&H: 10.1/32.7, Gluc: 140, Ca: 8.2, Alb: 3.2, UA: Negative. CT head: No acute intracranial hemorrhage or mass effect. No displaced calvarial fracture. Patient was seen by neurology who recommended "syncope eval as per primary team, Check orthostatics, encourage PO intake. Routine EEG - can be done as outpatient with her neurologist Dr. Tomlinson, PT/OT eval, supportive care as per primary team". When examined patient is resting in the stretcher and denied any current complaints.    CT head: No acute intracranial hemorrhage or mass effect. No displaced calvarial fracture.   CD Minimal heterogenous plaque noted within the proximal  right and left internal carotid arteries, consistent with  1-15% stenoses.  No hemodynamically significant stenoses  noted.    Physician of record notified as well as patient's son Ever Lea. 88 y/o Female with Hx of Afib (on Eliquis), Parkinson Disease, HTN, Hypothyroidism presented to the ED for syncopal episode yesterday. Patient was recently discharged from [facility] after month-long stay. Post-discharge, patient had persistent cough that recently worsened over the past few days. Patient had persistent cough throughout the night last night and she was unable to sleep. She presented to the ED yesterday morning at 5:30AM for evaluation of her cough. She was diagnosed with a URI and discharged home early in the afternoon. At home, at around 2PM, patient was eating her first meal of the day (last meal was 5/6PM the night before). While she was eating her oatmeal, she suddenly dropped her plate and dropped her head. Patient stated that she had LOC of about few seconds, but was confused initially which resolved by itself. Patient denied any falls or head trauma, seizure like activity, tongue laceration, bowel or bladder incontinence. Patient stated that when she "came about" her son noticed that she had left sided facial droop. Son then called EMS. Upon arrival of EMS about 4 min later, patient was still noted to be sluggish but more responsive and answering simple questions appropriately, with resolution of her facial droop. BP by EMS was 90s/60s. Patient endorsed chest pain when she coughs. Patient denied any fevers, chills, N/V/D/C, abdominal pain, melena, hematochezia, recent travel, sick contact, dysuria, pleuritic or positional chest pain.      On ED admission EKG revealed Atrial flutter with variable AV block, LAD at a rate of 99 with Qtc of 438 and TWI in lead I, AVL, CE x 1: Negative, H&H: 10.1/32.7, Gluc: 140, Ca: 8.2, Alb: 3.2, UA: Negative. CT head: No acute intracranial hemorrhage or mass effect. No displaced calvarial fracture. Patient was seen by neurology who recommended "syncope eval as per primary team, Check orthostatics, encourage PO intake. Routine EEG - can be done as outpatient with her neurologist Dr. Tomlinson, PT/OT eval, supportive care as per primary team". When examined patient is resting in the stretcher and denied any current complaints.    CT head: No acute intracranial hemorrhage or mass effect. No displaced calvarial fracture.   CD Minimal heterogenous plaque noted within the proximal  right and left internal carotid arteries, consistent with  1-15% stenoses.  No hemodynamically significant stenoses  noted.    12/30 [employer] called.     Physician of record notified as well as patient's son Ever Lea.  Son, Ever doesn't wish for autopsy.

## 2017-12-30 NOTE — PROGRESS NOTE ADULT - ATTENDING COMMENTS
Called to intubate this 87 year old female who was under cardiac arrest now in PEA.Dr. Nieves present to intubate.DL accomplished without difficulty.

## 2017-12-30 NOTE — PROGRESS NOTE ADULT - SUBJECTIVE AND OBJECTIVE BOX
Anesthesia called for emergency intubation.  On arrival, pt unresponsive, CPR initiated, and bag mask ventilation with 100% FiO2 being performed.  DL x _1_ with MAC 4 blade, grade __2_ view, _7.0__ cuffed ETT passed without trauma, + ETCO2 via EasyCap, + BBS, taped at _21_ cm, teeth intact, no complications.

## 2018-01-02 LAB
BACTERIA BLD CULT: SIGNIFICANT CHANGE UP
BACTERIA BLD CULT: SIGNIFICANT CHANGE UP

## 2018-01-16 ENCOUNTER — APPOINTMENT (OUTPATIENT)
Dept: OBGYN | Facility: CLINIC | Age: 83
End: 2018-01-16

## 2018-04-09 ENCOUNTER — APPOINTMENT (OUTPATIENT)
Dept: INTERNAL MEDICINE | Facility: CLINIC | Age: 83
End: 2018-04-09

## 2018-05-09 ENCOUNTER — APPOINTMENT (OUTPATIENT)
Dept: ENDOCRINOLOGY | Facility: CLINIC | Age: 83
End: 2018-05-09

## 2020-01-31 NOTE — PROGRESS NOTE ADULT - SUBJECTIVE AND OBJECTIVE BOX
Patient is a 87y old  Female who presents with a chief complaint of Witnessed syncopal episode (29 Dec 2017 04:08)      SUBJECTIVE / OVERNIGHT EVENTS: Pt seen and examined by me Feels well. Back to baseline. Denies cough/SOB/CP  pt not happy about being in the hospital      MEDICATIONS  (STANDING):  apixaban 2.5 milliGRAM(s) Oral every 12 hours  artificial  tears Solution 1 Drop(s) Both EYES three times a day  benzonatate 100 milliGRAM(s) Oral every 8 hours  carbidopa/levodopa CR 25/100 1 Tablet(s) Oral every 6 hours  cholecalciferol 2000 Unit(s) Oral daily  levothyroxine 100 MICROGram(s) Oral daily  metoprolol     tartrate 12.5 milliGRAM(s) Oral every 12 hours  sodium chloride 0.9%. 1000 milliLiter(s) (75 mL/Hr) IV Continuous <Continuous>    MEDICATIONS  (PRN):  docusate sodium 100 milliGRAM(s) Oral three times a day PRN Constipation  senna 2 Tablet(s) Oral at bedtime PRN Constipation      Vital Signs Last 24 Hrs  T(C): 36.4 (29 Dec 2017 12:53), Max: 38 (28 Dec 2017 16:36)  T(F): 97.5 (29 Dec 2017 12:53), Max: 100.4 (28 Dec 2017 16:36)  HR: 102 (29 Dec 2017 12:53) (92 - 108)  BP: 111/79 (29 Dec 2017 12:53) (95/65 - 135/89)  BP(mean): --  RR: 16 (29 Dec 2017 12:53) (16 - 18)  SpO2: 98% (29 Dec 2017 12:53) (96% - 100%)  CAPILLARY BLOOD GLUCOSE      POCT Blood Glucose.: 152 mg/dL (28 Dec 2017 15:13)    I&O's Summary      PHYSICAL EXAM:  GENERAL: NAD, well-developed  HEAD:  Atraumatic, Normocephalic  EYES: EOMI, PERRLA, conjunctiva and sclera clear  NECK: Supple, No JVD  CHEST/LUNG: Clear to auscultation bilaterally; No wheeze  HEART: Regular rate and rhythm; No murmurs, rubs, or gallops  ABDOMEN: Soft, Nontender, Nondistended; Bowel sounds present  EXTREMITIES:  2+ Peripheral Pulses, No clubbing, cyanosis, or edema  PSYCH: AAOx3  NEUROLOGY: non-focal  SKIN: No rashes or lesions    LABS:                        10.3   9.67  )-----------( 410      ( 29 Dec 2017 11:05 )             33.5         141  |  106  |  19  ----------------------------<  112<H>  4.0   |  20<L>  |  0.64    Ca    8.0<L>      29 Dec 2017 05:58  Mg     2.2         TPro  6.6  /  Alb  3.2<L>  /  TBili  0.5  /  DBili  x   /  AST  10  /  ALT  5   /  AlkPhos  65        CARDIAC MARKERS ( 29 Dec 2017 05:58 )  x     / < 0.06 ng/mL / 46 u/L / x     / x      CARDIAC MARKERS ( 28 Dec 2017 17:00 )  x     / < 0.06 ng/mL / 42 u/L / 1.49 ng/mL / x          Urinalysis Basic - ( 28 Dec 2017 16:17 )    Color: YELLOW / Appearance: CLEAR / S.024 / pH: 6.0  Gluc: NEGATIVE / Ketone: NEGATIVE  / Bili: NEGATIVE / Urobili: NORMAL mg/dL   Blood: NEGATIVE / Protein: 30 mg/dL / Nitrite: NEGATIVE   Leuk Esterase: NEGATIVE / RBC: 2-5 / WBC 2-5   Sq Epi: x / Non Sq Epi: x / Bacteria: x        RADIOLOGY & ADDITIONAL TESTS:    Imaging Personally Reviewed:    Consultant(s) Notes Reviewed:      Care Discussed with Consultants/Other Providers: not applicable (Male)

## 2021-01-29 NOTE — H&P ADULT - PROBLEM SELECTOR PLAN 1
Resolved. Likely at baseline now, baseline dementia AAOx0-1  - likely 2/2 metabolic vs infectious vs primary neurological  - CTH negative for acute pathology  - MRI/MRA of head/neck - poor study, minimal chronic ischemic changes in the frontoparietal white matter, small aneurysm R ICA Appreciate PT eval, plan for rehab, will need to d/w CM in AM Resolved. Likely at baseline now, baseline dementia AAOx0-1. Unable to follow commands. Likely at baseline, progression of dementia.   - likely 2/2 metabolic vs infectious vs primary neurological  - CTH negative for acute pathology  - MRI/MRA of head/neck - poor study, minimal chronic ischemic changes in the frontoparietal white matter, small aneurysm R ICA Appreciate PT robbei, plan for rehab, will need to d/w CM in AM  Last fall was years ago, patient usually goes to the gym 5x/week, however per son, has been weaker of late, with decreased PO intake  fall precautions -CTM

## 2025-02-20 NOTE — ED PROVIDER NOTE - DURATION
- Status post mechanical slip and fall with the below noted injuries.  - Fall precautions.  - PT and OT evaluation and treatment as indicated.  - Case Management consultation for disposition planning. D/c to [facility].      today

## 2025-06-29 NOTE — ED ADULT NURSE NOTE - NS ED NURSE RECORD ANOTHER VITAL SIGN
Likely tension headaches vs migraine. Reports has had episodic headaches for many years  CT head/MRI brain without acute process  Symptomatic control   Trial of fioricet for symptoms  Outpatient neurology referral for migraine management     Yes